# Patient Record
Sex: FEMALE | Race: ASIAN | NOT HISPANIC OR LATINO
[De-identification: names, ages, dates, MRNs, and addresses within clinical notes are randomized per-mention and may not be internally consistent; named-entity substitution may affect disease eponyms.]

---

## 2017-02-22 ENCOUNTER — RESULT REVIEW (OUTPATIENT)
Age: 67
End: 2017-02-22

## 2017-05-02 ENCOUNTER — APPOINTMENT (OUTPATIENT)
Dept: HEART AND VASCULAR | Facility: CLINIC | Age: 67
End: 2017-05-02

## 2017-05-02 VITALS
WEIGHT: 100 LBS | HEIGHT: 59 IN | BODY MASS INDEX: 20.16 KG/M2 | DIASTOLIC BLOOD PRESSURE: 62 MMHG | HEART RATE: 70 BPM | SYSTOLIC BLOOD PRESSURE: 102 MMHG

## 2017-05-03 LAB
ALBUMIN SERPL ELPH-MCNC: 4.7 G/DL
ALP BLD-CCNC: 44 U/L
ALT SERPL-CCNC: 17 U/L
ANION GAP SERPL CALC-SCNC: 15 MMOL/L
AST SERPL-CCNC: 14 U/L
BASOPHILS # BLD AUTO: 0.02 K/UL
BASOPHILS NFR BLD AUTO: 0.5 %
BILIRUB SERPL-MCNC: 0.7 MG/DL
BUN SERPL-MCNC: 14 MG/DL
CALCIUM SERPL-MCNC: 9.6 MG/DL
CHLORIDE SERPL-SCNC: 105 MMOL/L
CHOLEST SERPL-MCNC: 238 MG/DL
CHOLEST/HDLC SERPL: 2.8 RATIO
CO2 SERPL-SCNC: 23 MMOL/L
CREAT SERPL-MCNC: 0.69 MG/DL
CRP SERPL-MCNC: <0.2 MG/DL
EOSINOPHIL # BLD AUTO: 0.1 K/UL
EOSINOPHIL NFR BLD AUTO: 2.4 %
ERYTHROCYTE [SEDIMENTATION RATE] IN BLOOD BY WESTERGREN METHOD: 4 MM/HR
GLUCOSE SERPL-MCNC: 94 MG/DL
HCT VFR BLD CALC: 42.5 %
HDLC SERPL-MCNC: 86 MG/DL
HGB BLD-MCNC: 13.8 G/DL
IMM GRANULOCYTES NFR BLD AUTO: 0.2 %
LDLC SERPL CALC-MCNC: 142 MG/DL
LYMPHOCYTES # BLD AUTO: 1.34 K/UL
LYMPHOCYTES NFR BLD AUTO: 32.3 %
MAN DIFF?: NORMAL
MCHC RBC-ENTMCNC: 32.3 PG
MCHC RBC-ENTMCNC: 32.5 GM/DL
MCV RBC AUTO: 99.5 FL
MONOCYTES # BLD AUTO: 0.25 K/UL
MONOCYTES NFR BLD AUTO: 6 %
NEUTROPHILS # BLD AUTO: 2.43 K/UL
NEUTROPHILS NFR BLD AUTO: 58.6 %
PLATELET # BLD AUTO: 208 K/UL
POTASSIUM SERPL-SCNC: 4.8 MMOL/L
PROT SERPL-MCNC: 6.9 G/DL
RBC # BLD: 4.27 M/UL
RBC # FLD: 14.2 %
SODIUM SERPL-SCNC: 143 MMOL/L
TRIGL SERPL-MCNC: 52 MG/DL
WBC # FLD AUTO: 4.15 K/UL

## 2017-05-08 ENCOUNTER — TRANSCRIPTION ENCOUNTER (OUTPATIENT)
Age: 67
End: 2017-05-08

## 2017-06-13 ENCOUNTER — APPOINTMENT (OUTPATIENT)
Dept: HEART AND VASCULAR | Facility: CLINIC | Age: 67
End: 2017-06-13

## 2017-06-13 VITALS — DIASTOLIC BLOOD PRESSURE: 65 MMHG | HEART RATE: 95 BPM | SYSTOLIC BLOOD PRESSURE: 100 MMHG

## 2017-11-16 ENCOUNTER — APPOINTMENT (OUTPATIENT)
Dept: HEART AND VASCULAR | Facility: CLINIC | Age: 67
End: 2017-11-16
Payer: MEDICARE

## 2017-11-16 VITALS
DIASTOLIC BLOOD PRESSURE: 60 MMHG | WEIGHT: 100 LBS | BODY MASS INDEX: 20.16 KG/M2 | HEIGHT: 59 IN | HEART RATE: 79 BPM | SYSTOLIC BLOOD PRESSURE: 110 MMHG

## 2017-11-16 PROCEDURE — 93000 ELECTROCARDIOGRAM COMPLETE: CPT

## 2017-11-16 PROCEDURE — 99214 OFFICE O/P EST MOD 30 MIN: CPT | Mod: 25

## 2017-11-16 PROCEDURE — 93306 TTE W/DOPPLER COMPLETE: CPT

## 2017-11-16 PROCEDURE — 36415 COLL VENOUS BLD VENIPUNCTURE: CPT

## 2018-04-24 ENCOUNTER — APPOINTMENT (OUTPATIENT)
Dept: HEART AND VASCULAR | Facility: CLINIC | Age: 68
End: 2018-04-24
Payer: MEDICARE

## 2018-04-24 VITALS
BODY MASS INDEX: 19.96 KG/M2 | WEIGHT: 99 LBS | HEIGHT: 59 IN | SYSTOLIC BLOOD PRESSURE: 110 MMHG | DIASTOLIC BLOOD PRESSURE: 70 MMHG | HEART RATE: 68 BPM

## 2018-04-24 PROCEDURE — 99214 OFFICE O/P EST MOD 30 MIN: CPT | Mod: 25

## 2018-04-24 PROCEDURE — 93000 ELECTROCARDIOGRAM COMPLETE: CPT

## 2018-08-10 ENCOUNTER — APPOINTMENT (OUTPATIENT)
Dept: HEART AND VASCULAR | Facility: CLINIC | Age: 68
End: 2018-08-10
Payer: MEDICARE

## 2018-08-10 VITALS
HEART RATE: 108 BPM | TEMPERATURE: 101.1 F | WEIGHT: 98 LBS | SYSTOLIC BLOOD PRESSURE: 120 MMHG | DIASTOLIC BLOOD PRESSURE: 80 MMHG | BODY MASS INDEX: 19.79 KG/M2

## 2018-08-10 PROCEDURE — 87880 STREP A ASSAY W/OPTIC: CPT | Mod: QW

## 2018-08-10 PROCEDURE — 99213 OFFICE O/P EST LOW 20 MIN: CPT | Mod: 25

## 2018-08-13 LAB
ALBUMIN SERPL ELPH-MCNC: 4.7 G/DL
ALP BLD-CCNC: 48 U/L
ALT SERPL-CCNC: 23 U/L
ANION GAP SERPL CALC-SCNC: 14 MMOL/L
AST SERPL-CCNC: 20 U/L
BASOPHILS # BLD AUTO: 0.02 K/UL
BASOPHILS NFR BLD AUTO: 0.2 %
BILIRUB SERPL-MCNC: 0.6 MG/DL
BUN SERPL-MCNC: 9 MG/DL
CALCIUM SERPL-MCNC: 9.4 MG/DL
CHLORIDE SERPL-SCNC: 99 MMOL/L
CO2 SERPL-SCNC: 27 MMOL/L
CREAT SERPL-MCNC: 0.69 MG/DL
EOSINOPHIL # BLD AUTO: 0.06 K/UL
EOSINOPHIL NFR BLD AUTO: 0.7 %
GLUCOSE SERPL-MCNC: 95 MG/DL
HCT VFR BLD CALC: 40.2 %
HGB BLD-MCNC: 13.1 G/DL
IMM GRANULOCYTES NFR BLD AUTO: 0.2 %
LYMPHOCYTES # BLD AUTO: 1.43 K/UL
LYMPHOCYTES NFR BLD AUTO: 16.6 %
MAN DIFF?: NORMAL
MCHC RBC-ENTMCNC: 31.7 PG
MCHC RBC-ENTMCNC: 32.6 GM/DL
MCV RBC AUTO: 97.3 FL
MONOCYTES # BLD AUTO: 0.64 K/UL
MONOCYTES NFR BLD AUTO: 7.4 %
NEUTROPHILS # BLD AUTO: 6.47 K/UL
NEUTROPHILS NFR BLD AUTO: 74.9 %
PLATELET # BLD AUTO: 246 K/UL
POTASSIUM SERPL-SCNC: 4.5 MMOL/L
PROT SERPL-MCNC: 7.2 G/DL
RBC # BLD: 4.13 M/UL
RBC # FLD: 14.2 %
SODIUM SERPL-SCNC: 140 MMOL/L
WBC # FLD AUTO: 8.64 K/UL

## 2018-11-27 ENCOUNTER — APPOINTMENT (OUTPATIENT)
Dept: HEART AND VASCULAR | Facility: CLINIC | Age: 68
End: 2018-11-27
Payer: MEDICARE

## 2018-11-27 ENCOUNTER — NON-APPOINTMENT (OUTPATIENT)
Age: 68
End: 2018-11-27

## 2018-11-27 VITALS
WEIGHT: 99 LBS | HEIGHT: 59 IN | SYSTOLIC BLOOD PRESSURE: 118 MMHG | BODY MASS INDEX: 19.96 KG/M2 | HEART RATE: 107 BPM | DIASTOLIC BLOOD PRESSURE: 72 MMHG

## 2018-11-27 DIAGNOSIS — R07.89 OTHER CHEST PAIN: ICD-10-CM

## 2018-11-27 PROCEDURE — 99214 OFFICE O/P EST MOD 30 MIN: CPT | Mod: 25

## 2018-11-27 PROCEDURE — 93306 TTE W/DOPPLER COMPLETE: CPT

## 2018-11-27 PROCEDURE — 93000 ELECTROCARDIOGRAM COMPLETE: CPT

## 2018-11-27 RX ORDER — AZITHROMYCIN 500 MG/1
500 TABLET, FILM COATED ORAL DAILY
Qty: 3 | Refills: 0 | Status: DISCONTINUED | COMMUNITY
Start: 2018-08-10 | End: 2018-11-27

## 2018-11-29 PROBLEM — R07.89 CHEST TIGHTNESS: Status: RESOLVED | Noted: 2017-06-13 | Resolved: 2018-11-29

## 2018-11-29 NOTE — PHYSICAL EXAM
[General Appearance - Well Developed] : well developed [Normal Appearance] : normal appearance [Well Groomed] : well groomed [General Appearance - Well Nourished] : well nourished [No Deformities] : no deformities [General Appearance - In No Acute Distress] : no acute distress [Normal Conjunctiva] : the conjunctiva exhibited no abnormalities [Eyelids - No Xanthelasma] : the eyelids demonstrated no xanthelasmas [Normal Oral Mucosa] : normal oral mucosa [No Oral Pallor] : no oral pallor [No Oral Cyanosis] : no oral cyanosis [Normal Jugular Venous A Waves Present] : normal jugular venous A waves present [Normal Jugular Venous V Waves Present] : normal jugular venous V waves present [No Jugular Venous Mosqueda A Waves] : no jugular venous mosqueda A waves [Respiration, Rhythm And Depth] : normal respiratory rhythm and effort [Exaggerated Use Of Accessory Muscles For Inspiration] : no accessory muscle use [Auscultation Breath Sounds / Voice Sounds] : lungs were clear to auscultation bilaterally [Heart Rate And Rhythm] : heart rate and rhythm were normal [Heart Sounds] : normal S1 and S2 [Murmurs] : no murmurs present [Arterial Pulses Normal] : the arterial pulses were normal [Abdomen Soft] : soft [Abdomen Tenderness] : non-tender [Abdomen Mass (___ Cm)] : no abdominal mass palpated [Abnormal Walk] : normal gait [Gait - Sufficient For Exercise Testing] : the gait was sufficient for exercise testing [Nail Clubbing] : no clubbing of the fingernails [Cyanosis, Localized] : no localized cyanosis [Petechial Hemorrhages (___cm)] : no petechial hemorrhages [Skin Color & Pigmentation] : normal skin color and pigmentation [] : no rash [No Venous Stasis] : no venous stasis [Skin Lesions] : no skin lesions [No Skin Ulcers] : no skin ulcer [No Xanthoma] : no  xanthoma was observed [Oriented To Time, Place, And Person] : oriented to person, place, and time [Affect] : the affect was normal [Mood] : the mood was normal [No Anxiety] : not feeling anxious [FreeTextEntry1] : Reflexes intact. Motor 5/5 upper and lower extremity. Sensory intact.

## 2018-11-29 NOTE — HISTORY OF PRESENT ILLNESS
[FreeTextEntry1] : The patient has chest heaviness once a month when she is under stress. It's duration of seconds. She has also had fatigue. The patient has dyspnea on [place] subway stairs. She has an occasional cough. She is dizzy upon arising. She had an eye examination in January a Pap smear 1.5 years ago and a mammogram one year ago. She had a bone density this year which is improved. She received the flu vaccine.

## 2018-11-29 NOTE — DISCUSSION/SUMMARY
[FreeTextEntry1] : The patient has atypical/nonanginal chest discomfort. Her past stress test was normal. Her EKG is normal. Echocardiogram revealed posterior mitral valve prolapse with trace mitral regurgitation. Ejection fraction was 60%. The pain is not cardiac. The patient will see the pulmonologist. She will get a mammogram and a colonoscopy. She defers the shingles\par And we'll discuss it with Dr. Hernández.

## 2018-11-29 NOTE — REVIEW OF SYSTEMS
[Chest Pain] : chest pain [Cough] : cough [see HPI] : see HPI [Joint Pain] : joint pain [Negative] : Heme/Lymph

## 2019-03-08 ENCOUNTER — TRANSCRIPTION ENCOUNTER (OUTPATIENT)
Age: 69
End: 2019-03-08

## 2019-04-04 ENCOUNTER — APPOINTMENT (OUTPATIENT)
Dept: INTERVENTIONAL RADIOLOGY/VASCULAR | Facility: CLINIC | Age: 69
End: 2019-04-04
Payer: MEDICARE

## 2019-04-04 DIAGNOSIS — Z98.890 OTHER SPECIFIED POSTPROCEDURAL STATES: ICD-10-CM

## 2019-04-04 DIAGNOSIS — H81.49 VERTIGO OF CENTRAL ORIGIN, UNSPECIFIED EAR: ICD-10-CM

## 2019-04-04 DIAGNOSIS — Z78.9 OTHER SPECIFIED HEALTH STATUS: ICD-10-CM

## 2019-04-04 DIAGNOSIS — Z87.09 PERSONAL HISTORY OF OTHER DISEASES OF THE RESPIRATORY SYSTEM: ICD-10-CM

## 2019-04-04 DIAGNOSIS — R40.4 TRANSIENT ALTERATION OF AWARENESS: ICD-10-CM

## 2019-04-04 PROCEDURE — 99205 OFFICE O/P NEW HI 60 MIN: CPT

## 2019-04-04 NOTE — PHYSICAL EXAM
[Alert] : alert [No Acute Distress] : no acute distress [Well Nourished] : well nourished [Well Developed] : well developed [Healthy Appearance] : healthy appearance [Normal Voice/Communication] : normal voice communication [Normal Sclera/Conjunctiva] : normal sclera/conjunctiva [No Respiratory Distress] : no respiratory distress [Normal Rate and Effort] : normal respiratory rhythm and effort [No Accessory Muscle Use] : no accessory muscle use [Fully active, able to carry on all pre-disease performance without restriction] : Fully active, able to carry on all pre-disease performance without restriction [Normal Gait] : normal gait [Oriented x3] : oriented to person, place, and time [Normal Insight/Judgement] : insight and judgment were intact [Normal Affect] : the affect was normal [Normal Mood] : the mood was normal [Recent Memory Normal] : recent memory was not impaired [Remote Memory Normal] : remote memory was not impaired

## 2019-05-23 NOTE — ASSESSMENT
[Other: _____] : [unfilled] [FreeTextEntry1] : Ms. Leung is a 67 y/o female who presents today for consultation for treatment of newly diagnosed left lower PAVM.  Her CT scan from 2/25/19 demonstrates an enhancing serpiginous structure in the left upper lobe lingula compatible with pulmonary AVM.  \par \par I have reviewed her imaging and discussed the findings with  and Mrs. Leung.  I discussed the nature of pulmonary AVMs and how intervention is recommended to prevent future neurologic events from occurring. I discussed pulmonary angiogram/embolization procedure including risks, benefits, alternatives, and expected post procedure course.  Ms. Leung is planning to travel in May and would like to have the procedure deferred until she returns.  I have given her contact information for our booking office and she will be in touch with them to make arrangements.  \par \par I discussed starting prophylactic daily aspirin therapy, but pt states she was told by her rheumatologist to not take aspirin containing products.  I will be in touch with Dr. Mcfarlane to discuss this, as well as any required preprocedure changes of her medication regimen.\par \par I have provided the patient the opportunity to ask questions and have answered them to their satisfaction.  They are encouraged to contact our office with any further questions, concerns, or issues.\par

## 2019-05-23 NOTE — HISTORY OF PRESENT ILLNESS
[FreeTextEntry1] : This is a 67 y/o female with pmhx of RA, migraines, and MVP who presents today for consultation for pulmonary angiogram and embolization for newly diagnosed pulmonary AVM.  Pt reports having persistent bronchitis over the summer and so was sent for a CT scan in August by her pulmonologist. There was a questionable area of  AVM, but the study was limited due to lack of IV contrast.  \par \par A repeat CT scan with contrast was done in 2/2019 and she was found to have a left lower lobe pulmonary AVM.  She is now referred to IR by Dr. Huggins for pulmonary angiogram and embolization.  Pt reports a history of temporary peripheral vision loss a few years ago, that was attributed to a migraine and had intermittent mild epistaxis a few times, but otherwise denies neurologic deficits, hx of TIA, stroke, hypoxia, or GIB.

## 2019-05-23 NOTE — CONSULT LETTER
[Dear  ___] : Dear  [unfilled], [Courtesy Letter:] : I had the pleasure of seeing your patient, [unfilled], in my office today. [Please see my note below.] : Please see my note below. [Consult Closing:] : Thank you very much for allowing me to participate in the care of this patient.  If you have any questions, please do not hesitate to contact me. [Sincerely,] : Sincerely, [FreeTextEntry3] : Clive Ramírez MD

## 2019-05-30 ENCOUNTER — OUTPATIENT (OUTPATIENT)
Dept: OUTPATIENT SERVICES | Facility: HOSPITAL | Age: 69
LOS: 1 days | End: 2019-05-30
Payer: MEDICARE

## 2019-05-30 VITALS
DIASTOLIC BLOOD PRESSURE: 76 MMHG | HEIGHT: 59 IN | HEART RATE: 84 BPM | TEMPERATURE: 98 F | WEIGHT: 95.02 LBS | RESPIRATION RATE: 18 BRPM | SYSTOLIC BLOOD PRESSURE: 135 MMHG | OXYGEN SATURATION: 99 %

## 2019-05-30 DIAGNOSIS — Z01.818 ENCOUNTER FOR OTHER PREPROCEDURAL EXAMINATION: ICD-10-CM

## 2019-05-30 DIAGNOSIS — Z98.891 HISTORY OF UTERINE SCAR FROM PREVIOUS SURGERY: Chronic | ICD-10-CM

## 2019-05-30 DIAGNOSIS — Q25.72 CONGENITAL PULMONARY ARTERIOVENOUS MALFORMATION: ICD-10-CM

## 2019-05-30 LAB
ANION GAP SERPL CALC-SCNC: 12 MMOL/L — SIGNIFICANT CHANGE UP (ref 5–17)
BLD GP AB SCN SERPL QL: NEGATIVE — SIGNIFICANT CHANGE UP
BUN SERPL-MCNC: 14 MG/DL — SIGNIFICANT CHANGE UP (ref 7–23)
CALCIUM SERPL-MCNC: 9.8 MG/DL — SIGNIFICANT CHANGE UP (ref 8.4–10.5)
CHLORIDE SERPL-SCNC: 104 MMOL/L — SIGNIFICANT CHANGE UP (ref 96–108)
CO2 SERPL-SCNC: 26 MMOL/L — SIGNIFICANT CHANGE UP (ref 22–31)
CREAT SERPL-MCNC: 0.6 MG/DL — SIGNIFICANT CHANGE UP (ref 0.5–1.3)
GLUCOSE SERPL-MCNC: 78 MG/DL — SIGNIFICANT CHANGE UP (ref 70–99)
HCT VFR BLD CALC: 40.1 % — SIGNIFICANT CHANGE UP (ref 34.5–45)
HGB BLD-MCNC: 12.7 G/DL — SIGNIFICANT CHANGE UP (ref 11.5–15.5)
MCHC RBC-ENTMCNC: 30.8 PG — SIGNIFICANT CHANGE UP (ref 27–34)
MCHC RBC-ENTMCNC: 31.7 GM/DL — LOW (ref 32–36)
MCV RBC AUTO: 97.1 FL — SIGNIFICANT CHANGE UP (ref 80–100)
PLATELET # BLD AUTO: 254 K/UL — SIGNIFICANT CHANGE UP (ref 150–400)
POTASSIUM SERPL-MCNC: 3.5 MMOL/L — SIGNIFICANT CHANGE UP (ref 3.5–5.3)
POTASSIUM SERPL-SCNC: 3.5 MMOL/L — SIGNIFICANT CHANGE UP (ref 3.5–5.3)
RBC # BLD: 4.13 M/UL — SIGNIFICANT CHANGE UP (ref 3.8–5.2)
RBC # FLD: 13.6 % — SIGNIFICANT CHANGE UP (ref 10.3–14.5)
RH IG SCN BLD-IMP: POSITIVE — SIGNIFICANT CHANGE UP
SODIUM SERPL-SCNC: 142 MMOL/L — SIGNIFICANT CHANGE UP (ref 135–145)
WBC # BLD: 6.29 K/UL — SIGNIFICANT CHANGE UP (ref 3.8–10.5)
WBC # FLD AUTO: 6.29 K/UL — SIGNIFICANT CHANGE UP (ref 3.8–10.5)

## 2019-05-30 PROCEDURE — 86850 RBC ANTIBODY SCREEN: CPT

## 2019-05-30 PROCEDURE — 86901 BLOOD TYPING SEROLOGIC RH(D): CPT

## 2019-05-30 PROCEDURE — G0463: CPT

## 2019-05-30 PROCEDURE — 85027 COMPLETE CBC AUTOMATED: CPT

## 2019-05-30 PROCEDURE — 86900 BLOOD TYPING SEROLOGIC ABO: CPT

## 2019-05-30 PROCEDURE — 80048 BASIC METABOLIC PNL TOTAL CA: CPT

## 2019-05-30 NOTE — H&P PST ADULT - NSANTHOSAYNRD_GEN_A_CORE
No. SMITA screening performed.  STOP BANG Legend: 0-2 = LOW Risk; 3-4 = INTERMEDIATE Risk; 5-8 = HIGH Risk

## 2019-05-30 NOTE — H&P PST ADULT - HISTORY OF PRESENT ILLNESS
69 y/o F PMH asymptomatic MVP, stable bronchietasis, is being followed by Dr. Huggins from pulmonology, no recent steroids, incidentally found to have pulmonary AVM angiogram and embolization. 67 y/o F PMH asymptomatic MVP, stable bronchietasis, is being followed by Dr. Huggins from pulmonology, no recent steroids, incidentally found to have pulmonary AVM.  Presents today for pulmonary AVM angiogram and embolization.

## 2019-05-30 NOTE — H&P PST ADULT - NSICDXPASTMEDICALHX_GEN_ALL_CORE_FT
PAST MEDICAL HISTORY:  Bronchiectasis     MVP (mitral valve prolapse) PAST MEDICAL HISTORY:  Bronchiectasis     H/O migraine     MVP (mitral valve prolapse)     PFO (patent foramen ovale) as per patient is resolved    Rheumatoid arthritis

## 2019-05-30 NOTE — H&P PST ADULT - NSICDXPROBLEM_GEN_ALL_CORE_FT
PROBLEM DIAGNOSES  Problem: Congenital pulmonary arteriovenous malformation  Assessment and Plan: Pulmonary AVM angio and embolization

## 2019-05-31 ENCOUNTER — NON-APPOINTMENT (OUTPATIENT)
Age: 69
End: 2019-05-31

## 2019-05-31 ENCOUNTER — APPOINTMENT (OUTPATIENT)
Dept: HEART AND VASCULAR | Facility: CLINIC | Age: 69
End: 2019-05-31
Payer: MEDICARE

## 2019-05-31 VITALS
WEIGHT: 96 LBS | HEIGHT: 59 IN | OXYGEN SATURATION: 97 % | SYSTOLIC BLOOD PRESSURE: 118 MMHG | BODY MASS INDEX: 19.35 KG/M2 | HEART RATE: 86 BPM | DIASTOLIC BLOOD PRESSURE: 78 MMHG

## 2019-05-31 PROBLEM — Z86.69 PERSONAL HISTORY OF OTHER DISEASES OF THE NERVOUS SYSTEM AND SENSE ORGANS: Chronic | Status: ACTIVE | Noted: 2019-05-30

## 2019-05-31 PROBLEM — Q21.1 ATRIAL SEPTAL DEFECT: Chronic | Status: ACTIVE | Noted: 2019-05-30

## 2019-05-31 PROBLEM — I34.1 NONRHEUMATIC MITRAL (VALVE) PROLAPSE: Chronic | Status: ACTIVE | Noted: 2019-05-30

## 2019-05-31 PROBLEM — M06.9 RHEUMATOID ARTHRITIS, UNSPECIFIED: Chronic | Status: ACTIVE | Noted: 2019-05-30

## 2019-05-31 PROBLEM — J47.9 BRONCHIECTASIS, UNCOMPLICATED: Chronic | Status: ACTIVE | Noted: 2019-05-30

## 2019-05-31 PROCEDURE — 93000 ELECTROCARDIOGRAM COMPLETE: CPT

## 2019-05-31 PROCEDURE — 99214 OFFICE O/P EST MOD 30 MIN: CPT | Mod: 25

## 2019-06-03 ENCOUNTER — TRANSCRIPTION ENCOUNTER (OUTPATIENT)
Age: 69
End: 2019-06-03

## 2019-06-03 NOTE — DISCUSSION/SUMMARY
[FreeTextEntry1] : The patient is scheduled for coil placement and AV malformation in her lungs. She has a persistent cough. She has been walking without chest pain or dyspnea. She has no abnormal bleeding clotting or difficulty with anesthesia. Her EKG is normal.The patient's RCRI score zero.  her cardiac risk for the procedure is low. Her laboratory tests are adequate she is optiimized for the procedure.

## 2019-06-03 NOTE — HISTORY OF PRESENT ILLNESS
[FreeTextEntry1] : The patient has a pulmonary AV malformation. The plan is to place a coil. The patient was a great deal on vacation without chest pain or dyspnea. Her diet is good. Her joint pain has been under control. She has had a cough. The patient has no history of abnormal bleeding clotting or difficulty with anesthesia. Her preoperative laboratory testing was done. She was told of her recent sugar of 120

## 2019-06-03 NOTE — PHYSICAL EXAM
[General Appearance - Well Developed] : well developed [Normal Appearance] : normal appearance [Well Groomed] : well groomed [General Appearance - Well Nourished] : well nourished [No Deformities] : no deformities [General Appearance - In No Acute Distress] : no acute distress [Normal Conjunctiva] : the conjunctiva exhibited no abnormalities [Eyelids - No Xanthelasma] : the eyelids demonstrated no xanthelasmas [Normal Oral Mucosa] : normal oral mucosa [No Oral Pallor] : no oral pallor [No Oral Cyanosis] : no oral cyanosis [Normal Jugular Venous A Waves Present] : normal jugular venous A waves present [Normal Jugular Venous V Waves Present] : normal jugular venous V waves present [No Jugular Venous Mosqueda A Waves] : no jugular venous mosqueda A waves [Respiration, Rhythm And Depth] : normal respiratory rhythm and effort [Exaggerated Use Of Accessory Muscles For Inspiration] : no accessory muscle use [Auscultation Breath Sounds / Voice Sounds] : lungs were clear to auscultation bilaterally [Heart Rate And Rhythm] : heart rate and rhythm were normal [Heart Sounds] : normal S1 and S2 [Murmurs] : no murmurs present [Arterial Pulses Normal] : the arterial pulses were normal [Abdomen Soft] : soft [Abdomen Tenderness] : non-tender [Abdomen Mass (___ Cm)] : no abdominal mass palpated [Abnormal Walk] : normal gait [Gait - Sufficient For Exercise Testing] : the gait was sufficient for exercise testing [Nail Clubbing] : no clubbing of the fingernails [Cyanosis, Localized] : no localized cyanosis [Petechial Hemorrhages (___cm)] : no petechial hemorrhages [Skin Color & Pigmentation] : normal skin color and pigmentation [] : no rash [No Venous Stasis] : no venous stasis [Skin Lesions] : no skin lesions [No Skin Ulcers] : no skin ulcer [No Xanthoma] : no  xanthoma was observed [Oriented To Time, Place, And Person] : oriented to person, place, and time [Affect] : the affect was normal [Mood] : the mood was normal [No Anxiety] : not feeling anxious [FreeTextEntry1] : Tympanic membranes are normal.Throat erythema. Neck supple

## 2019-06-05 ENCOUNTER — INPATIENT (INPATIENT)
Facility: HOSPITAL | Age: 69
LOS: 0 days | Discharge: HOME | DRG: 254 | End: 2019-06-06
Attending: HOSPITALIST | Admitting: STUDENT IN AN ORGANIZED HEALTH CARE EDUCATION/TRAINING PROGRAM
Payer: COMMERCIAL

## 2019-06-05 VITALS
OXYGEN SATURATION: 100 % | DIASTOLIC BLOOD PRESSURE: 60 MMHG | TEMPERATURE: 97 F | SYSTOLIC BLOOD PRESSURE: 112 MMHG | HEART RATE: 69 BPM | RESPIRATION RATE: 14 BRPM

## 2019-06-05 DIAGNOSIS — Q25.72 CONGENITAL PULMONARY ARTERIOVENOUS MALFORMATION: ICD-10-CM

## 2019-06-05 DIAGNOSIS — Z29.9 ENCOUNTER FOR PROPHYLACTIC MEASURES, UNSPECIFIED: ICD-10-CM

## 2019-06-05 DIAGNOSIS — M06.9 RHEUMATOID ARTHRITIS, UNSPECIFIED: ICD-10-CM

## 2019-06-05 DIAGNOSIS — Z98.891 HISTORY OF UTERINE SCAR FROM PREVIOUS SURGERY: Chronic | ICD-10-CM

## 2019-06-05 PROCEDURE — 36015 PLACE CATHETER IN ARTERY: CPT | Mod: LT

## 2019-06-05 PROCEDURE — 99222 1ST HOSP IP/OBS MODERATE 55: CPT

## 2019-06-05 PROCEDURE — 37242 VASC EMBOLIZE/OCCLUDE ARTERY: CPT

## 2019-06-05 PROCEDURE — 76937 US GUIDE VASCULAR ACCESS: CPT | Mod: 26

## 2019-06-05 RX ORDER — FLUTICASONE FUROATE AND VILANTEROL TRIFENATATE 100; 25 UG/1; UG/1
1 POWDER RESPIRATORY (INHALATION)
Qty: 0 | Refills: 0 | DISCHARGE

## 2019-06-05 RX ORDER — SUMATRIPTAN SUCCINATE 4 MG/.5ML
1 INJECTION, SOLUTION SUBCUTANEOUS
Qty: 0 | Refills: 0 | DISCHARGE

## 2019-06-05 RX ORDER — CHOLECALCIFEROL (VITAMIN D3) 125 MCG
1 CAPSULE ORAL
Qty: 0 | Refills: 0 | DISCHARGE

## 2019-06-05 RX ORDER — ACETAMINOPHEN 500 MG
650 TABLET ORAL EVERY 6 HOURS
Refills: 0 | Status: DISCONTINUED | OUTPATIENT
Start: 2019-06-05 | End: 2019-06-06

## 2019-06-05 NOTE — PROGRESS NOTE ADULT - SUBJECTIVE AND OBJECTIVE BOX
67 y/o F PMH asymptomatic MVP, stable bronchietasis incidentally found to have pulmonary AVM referred to IR for pulmonary angiogram and possible AMV embolization.     NPO status: NPO past midnight .  Anticoagulation: None.      Allergies: No Known Allergies      PAST MEDICAL & SURGICAL HISTORY:  Rheumatoid arthritis  H/O migraine  PFO (patent foramen ovale): as per patient is resolved  MVP (mitral valve prolapse)  Bronchiectasis  S/P : X 2    Pertinent labs:  Complete Blood Count (19 @ 14:58)    WBC Count: 6.29 K/uL    RBC Count: 4.13 M/uL    Hemoglobin: 12.7 g/dL    Hematocrit: 40.1 %    Mean Cell Volume: 97.1 fl    Mean Cell Hemoglobin: 30.8 pg    Mean Cell Hemoglobin Conc: 31.7 gm/dL    Red Cell Distrib Width: 13.6 %    Platelet Count - Automated: 254 K/uL    Basic Metabolic Panel (19 @ 13:06)    Sodium, Serum: 142 mmol/L    Potassium, Serum: 3.5 mmol/L    Chloride, Serum: 104 mmol/L    Carbon Dioxide, Serum: 26 mmol/L    Anion Gap, Serum: 12 mmol/L    Blood Urea Nitrogen, Serum: 14 mg/dL    Creatinine, Serum: 0.60 mg/dL    Glucose, Serum: 78 mg/dL    Calcium, Total Serum: 9.8 mg/dL    eGFR if Non : 93: Interpretative comment  The units for eGFR are mL/min/1.73M2 (normalized body surface area). The  eGFR is calculated from a serum creatinine using the CKD-EPI equation.  Other variables required for calculation are race, age and sex. Among  patients with chronic kidney disease (CKD), the eGFR is useful in  determining the stage of disease according to KDOQI CKD classification.  All eGFR results are reported numerically with the following  interpretation.          GFR                    With                 Without     (ml/min/1.73 m2)    Kidney Damage       Kidney Damage        >= 90                    Stage 1                     Normal        60-89                    Stage 2                     Decreased GFR        30-59     Stage 3                     Stage 3        15-29                    Stage 4                     Stage 4        < 15                      Stage 5                     Stage 5  Each stage of CKD assumes that the associated GFR level has been in  effect for at least 3 months. Determination of stages one and two (with  eGFR > 59 ml/min/m2) requires estimation of kidney damage for at least 3  months as defined by structural or functional abnormalities.  Limitations: All estimates of GFR will be less accurate for patients at  extremes of muscle mass (including but not limited to frail elderly,  critically ill, or cancer patients), those with unusual diets, and those  with conditions associated with reduced secretion or extrarenal  elimination of creatinine. The eGFR equation is not recommended for use  in patients with unstable creatinine levels. mL/min/1.73M2    eGFR if African American: 108 mL/min/1.73M2    Consent: Procedure/risks/ Benefits explained. Informed consent obtained. Pt verbalizes understanding.

## 2019-06-05 NOTE — H&P ADULT - PROBLEM SELECTOR PLAN 2
Regular diet  Dispo: Independent Takes Embrel Wednesday and Saturday (says she does not need today) and Methotrexate on Sundays

## 2019-06-05 NOTE — H&P ADULT - NSHPPHYSICALEXAM_GEN_ALL_CORE
ICU Vital Signs Last 24 Hrs  T(C): 36 (05 Jun 2019 12:20), Max: 36 (05 Jun 2019 12:20)  T(F): 96.8 (05 Jun 2019 12:20), Max: 96.8 (05 Jun 2019 12:20)  HR: 68 (05 Jun 2019 15:15) (57 - 70)  BP: 103/56 (05 Jun 2019 15:15) (103/56 - 121/58)  BP(mean): 74 (05 Jun 2019 15:15) (74 - 84)  ABP: --  ABP(mean): --  RR: 14 (05 Jun 2019 15:15) (12 - 14)  SpO2: 100% (05 Jun 2019 15:15) (94% - 100%)

## 2019-06-05 NOTE — H&P ADULT - NSICDXPASTMEDICALHX_GEN_ALL_CORE_FT
PAST MEDICAL HISTORY:  Bronchiectasis     H/O migraine     MVP (mitral valve prolapse)     PFO (patent foramen ovale) as per patient is resolved    Rheumatoid arthritis

## 2019-06-05 NOTE — H&P ADULT - HISTORY OF PRESENT ILLNESS
68F pmhx of bronchiectesis, MVP, pulmonary AVM admitted for pulmonary embolization.  She underwent pulmonary embolization for AVM today and tolerated procedure well.  She has no recent illnesses or issues.

## 2019-06-05 NOTE — H&P ADULT - PROBLEM SELECTOR PLAN 1
S/P angio and embolization tolerated procedure well  Monitor O/N and likely DC in am. S/P angio and embolization tolerated procedure well  Monitor O/N and likely DC in am.  Tylenol PRN pain, no pain now   Right groin site w/o hematoma.

## 2019-06-06 ENCOUNTER — TRANSCRIPTION ENCOUNTER (OUTPATIENT)
Age: 69
End: 2019-06-06

## 2019-06-06 VITALS
OXYGEN SATURATION: 99 % | HEART RATE: 84 BPM | TEMPERATURE: 99 F | RESPIRATION RATE: 16 BRPM | DIASTOLIC BLOOD PRESSURE: 81 MMHG | SYSTOLIC BLOOD PRESSURE: 127 MMHG

## 2019-06-06 LAB
HCV AB S/CO SERPL IA: 0.24 S/CO — SIGNIFICANT CHANGE UP (ref 0–0.99)
HCV AB SERPL-IMP: SIGNIFICANT CHANGE UP

## 2019-06-06 PROCEDURE — 86803 HEPATITIS C AB TEST: CPT

## 2019-06-06 PROCEDURE — 71045 X-RAY EXAM CHEST 1 VIEW: CPT | Mod: 26

## 2019-06-06 PROCEDURE — 37242 VASC EMBOLIZE/OCCLUDE ARTERY: CPT

## 2019-06-06 PROCEDURE — 71045 X-RAY EXAM CHEST 1 VIEW: CPT

## 2019-06-06 PROCEDURE — 99239 HOSP IP/OBS DSCHRG MGMT >30: CPT

## 2019-06-06 PROCEDURE — 86901 BLOOD TYPING SEROLOGIC RH(D): CPT

## 2019-06-06 PROCEDURE — 76937 US GUIDE VASCULAR ACCESS: CPT

## 2019-06-06 PROCEDURE — C1889: CPT

## 2019-06-06 PROCEDURE — 86900 BLOOD TYPING SEROLOGIC ABO: CPT

## 2019-06-06 PROCEDURE — C1894: CPT

## 2019-06-06 PROCEDURE — C1887: CPT

## 2019-06-06 PROCEDURE — 36015 PLACE CATHETER IN ARTERY: CPT

## 2019-06-06 PROCEDURE — C1769: CPT

## 2019-06-06 RX ORDER — ACETAMINOPHEN 500 MG
2 TABLET ORAL
Qty: 0 | Refills: 0 | DISCHARGE
Start: 2019-06-06

## 2019-06-06 NOTE — DISCHARGE NOTE PROVIDER - NSDCFUADDAPPT_GEN_ALL_CORE_FT
Please have a CTA in 4 weeks then follow-up imaging results with Dr. Ramírez.    Please also follow-up with Dr. Huggins (pulmonology) within 2-4 weeks of discharge.

## 2019-06-06 NOTE — PROGRESS NOTE ADULT - SUBJECTIVE AND OBJECTIVE BOX
INTERVAL HPI/OVERNIGHT EVENTS: patient s/p IR embolization of pulmonary AVM     SUBJECTIVE: Patient seen and examined at bedside. Patient reports feeling well. Patient denies chest pain/shortness of breath/abdominal pain/nausea/vomiting/diarrhea/cough/fevers/chills.     ROS: otherwise negative    OBJECTIVE:    VITAL SIGNS:  ICU Vital Signs Last 24 Hrs  T(C): 36.7 (06 Jun 2019 05:07), Max: 36.9 (05 Jun 2019 16:00)  T(F): 98.1 (06 Jun 2019 05:07), Max: 98.4 (05 Jun 2019 16:00)  HR: 80 (06 Jun 2019 05:07) (57 - 87)  BP: 103/65 (06 Jun 2019 05:07) (93/51 - 127/70)  BP(mean): 81 (05 Jun 2019 20:36) (68 - 84)  RR: 18 (06 Jun 2019 05:07) (12 - 18)  SpO2: 98% (06 Jun 2019 05:07) (94% - 100%)    06-05 @ 07:01  -  06-06 @ 07:00  --------------------------------------------------------  IN: 720 mL / OUT: 875 mL / NET: -155 mL    CAPILLARY BLOOD GLUCOSE    PHYSICAL EXAM:  General: elderly female, appearing stated age. No acute distress   HEENT: normocephalic, atraumatic. Moist mucous membranes   Eyes: clear conjunctiva, PERRL  Neck: supple  Respiratory: clear to auscultation bilaterally   Cardiovascular: S1, S2. Regular rate and rhythm. No murmurs/rubs/gallops  Abdomen: soft, nondistended, nontender. +BS  Extremities: WWP, No LE edema. Right groin access nontender, no hematoma. 2+ pulses in all 4 extremities  Skin: normal color and turgor; no rash  Neurological: A&Ox3. CN2-12 intact     MEDICATIONS:  MEDICATIONS  (STANDING):    MEDICATIONS  (PRN):  acetaminophen   Tablet .. 650 milliGRAM(s) Oral every 6 hours PRN Moderate Pain (4 - 6)    ALLERGIES:  Allergies    No Known Allergies    Intolerances    LABS:    RADIOLOGY & ADDITIONAL TESTS: Reviewed.

## 2019-06-06 NOTE — PROGRESS NOTE ADULT - ATTENDING COMMENTS
-Patient seen/examined on 6/6/19. Case/plan discussed with the intern/resident as reviewed/edited by me above and in any comments below.  -Patient without any symptoms.   -IR cleared patient for DC.  -Patient stable for DC to home today with outpatient pulmonary, PMD, and IR follow up.  -31 minutes spent on the discharge process.

## 2019-06-06 NOTE — PROGRESS NOTE ADULT - SUBJECTIVE AND OBJECTIVE BOX
Interventional Radiology Follow- Up Note      68y Female s/p pulmonary angiogram and embolization of left PAVM on 6/5/19 in Interventional Radiology with Dr Ramírez.     Patient seen and examined @ bedside.   No complaints offered.    Vitals: T(F): 98.1 (06-06-19 @ 05:07), Max: 98.4 (06-05-19 @ 16:00)  HR: 80 (06-06-19 @ 05:07) (57 - 87)  BP: 103/65 (06-06-19 @ 05:07) (93/51 - 127/70)  RR: 18 (06-06-19 @ 05:07) (12 - 18)  SpO2: 98% (06-06-19 @ 05:07) (94% - 100%)  Wt(kg): --    LABS:            I&O's Detail    05 Jun 2019 07:01  -  06 Jun 2019 07:00  --------------------------------------------------------  IN:    Oral Fluid: 720 mL  Total IN: 720 mL    OUT:    Indwelling Catheter - Urethral: 775 mL    Voided: 100 mL  Total OUT: 875 mL    Total NET: -155 mL            PHYSICAL EXAM:  General: Nontoxic, in NAD  Neuro:  Alert & oriented x 3  CV: +S1+S2 regular rate and rhythm  Lung: clear to ausculation bilaterally, respirations nonlabored, good inspiratory effort  Abdomen: soft, NTND. Normactive BS  Extremities: no pedal edema or calf tenderness noted         Impression: 68y Female admitted with Congenital pulmonary arteriovenous malformation        Plan:  -continue to monitor ouput    -Flush drain per doctor orders  -trend vitals, labs  -will discuss with IR attending     Please call IR at extension [phone] with any questions, concerns, or issues regarding above. Interventional Radiology Follow- Up Note      68y Female s/p pulmonary angiogram and embolization of left PAVM on 6/5/19 in Interventional Radiology with Dr Ramírez.     Patient seen and examined @ bedside with Dr. Ramírez.   No complaints offered.    Vitals: T(F): 98.1 (06-06-19 @ 05:07), Max: 98.4 (06-05-19 @ 16:00)  HR: 80 (06-06-19 @ 05:07) (57 - 87)  BP: 103/65 (06-06-19 @ 05:07) (93/51 - 127/70)  RR: 18 (06-06-19 @ 05:07) (12 - 18)  SpO2: 98% (06-06-19 @ 05:07) (94% - 100%)  Wt(kg): --    LABS:    no new labs required      I&O's Detail    05 Jun 2019 07:01  -  06 Jun 2019 07:00  --------------------------------------------------------  IN:    Oral Fluid: 720 mL  Total IN: 720 mL    OUT:    Indwelling Catheter - Urethral: 775 mL    Voided: 100 mL  Total OUT: 875 mL    Total NET: -155 mL    PHYSICAL EXAM:  General: Nontoxic, in NAD  Neuro:  Alert & oriented x 3  Lung:  respirations nonlabored, good inspiratory effort  Abdomen: soft, NTND  Extremities: no pedal edema or calf tenderness noted, right groin puncture site c/d/i, dsg removed, no s/s of ecchymosis/hematoma, +2 femoral and DP pulse, +sensation, +movement, warm to touch    Impression: 68F pmhx of bronchiectasis MVP, pulmonary AVM admitted for pulmonary embolization.  She underwent pulmonary embolization for AVM on 6/5/19.       Plan:  -CXR (done, reviewed by Dr. Ramírez)  -OOB ambulate  -f/u CTA in 1 month-rx given  -f/u with Dr. Ramírez after imaging  -f/u with pulmonology, Dr. Huggins  -pt stable for d/c home from IR standpoint, d/c once cleared by Medicine team    Reviewed above with Medicine team.     Please call IR at extension [phone] with any questions, concerns, or issues regarding above. Interventional Radiology Follow- Up Note      68y Female s/p pulmonary angiogram and embolization of left PAVM on 6/5/19 in Interventional Radiology with Dr Ramírez.     Patient seen and examined @ bedside with Dr. Ramírez. Pt tolerating regular diet, voiding after mitchell removal.   No complaints offered.    Vitals: T(F): 98.1 (06-06-19 @ 05:07), Max: 98.4 (06-05-19 @ 16:00)  HR: 80 (06-06-19 @ 05:07) (57 - 87)  BP: 103/65 (06-06-19 @ 05:07) (93/51 - 127/70)  RR: 18 (06-06-19 @ 05:07) (12 - 18)  SpO2: 98% (06-06-19 @ 05:07) (94% - 100%)  Wt(kg): --    LABS:    no new labs required      I&O's Detail    05 Jun 2019 07:01  -  06 Jun 2019 07:00  --------------------------------------------------------  IN:    Oral Fluid: 720 mL  Total IN: 720 mL    OUT:    Indwelling Catheter - Urethral: 775 mL    Voided: 100 mL  Total OUT: 875 mL    Total NET: -155 mL    PHYSICAL EXAM:  General: Nontoxic, in NAD  Neuro:  Alert & oriented x 3  Lung:  respirations nonlabored, good inspiratory effort  Abdomen: soft, NTND  Extremities: no pedal edema or calf tenderness noted, right groin puncture site c/d/i, dsg removed, no s/s of ecchymosis/hematoma, +2 femoral and DP pulse, +sensation, +movement, warm to touch    Impression: 68F pmhx of bronchiectasis MVP, pulmonary AVM admitted for pulmonary embolization.  She underwent pulmonary embolization for AVM on 6/5/19.       Plan:  -CXR (done, reviewed by Dr. Ramírez)  -OOB ambulate  -f/u CTA in 1 month-rx given  -f/u with Dr. Ramírez after imaging  -f/u with pulmonology, Dr. Huggins  -pt stable for d/c home from IR standpoint, d/c once cleared by Medicine team    Reviewed above with Medicine team.     Please call IR at extension [phone] with any questions, concerns, or issues regarding above.

## 2019-06-06 NOTE — DISCHARGE NOTE PROVIDER - NSDCCPCAREPLAN_GEN_ALL_CORE_FT
PRINCIPAL DISCHARGE DIAGNOSIS  Diagnosis: Congenital pulmonary arteriovenous malformation  Assessment and Plan of Treatment: You had a pulmonary angiogram and embolization of left lower lobe pulmonary AVM by Dr. Ramírez in Interventional Radiology on _______.    Please have a follow up CTA in 4 weeks, then schedule follow up with Dr. Ramírez to review imaging.  Monitor right groin site for symptoms of bleeding, hard area underneath the skin, bruising, numbness, intense pain, or inability to move.  If you have any of these symptoms, contact Dr. Ramírez and seek immediate medical attention PRINCIPAL DISCHARGE DIAGNOSIS  Diagnosis: Congenital pulmonary arteriovenous malformation  Assessment and Plan of Treatment: You had a pulmonary angiogram and embolization of left lower lobe pulmonary AVM by Dr. Ramírez in Interventional Radiology on June 5th. Please have a follow up CTA in 4 weeks, then schedule follow up with Dr. Ramírez to review imaging.  Monitor right groin site for symptoms of bleeding, hardness underneath the skin, bruising, numbness, intense pain, or inability to move.  If you have any of these symptoms, contact Dr. Ramírez and seek immediate medical attention.   Please also follow up with Dr. Huggins (pulmonology) within 2-4 weeks of discharge.

## 2019-06-06 NOTE — DISCHARGE NOTE PROVIDER - CARE PROVIDER_API CALL
Clive Ramírez)  Interventional Radiology and Diagnostic Radiology  [id number] [place] [address]  Phone: [phone]  Fax: [phone]  Follow Up Time:     Tracie Huggins)  Critical Care Medicine; Internal Medicine; Pulmonary Disease  [address] [place] [address], [address] [address]  Phone: [phone]  Fax: [phone]  Follow Up Time:

## 2019-06-06 NOTE — DISCHARGE NOTE PROVIDER - NSDCACTIVITY_GEN_ALL_CORE
x 48 hours, then resume prior activity level/No heavy lifting/straining/Do not make important decisions/Do not drive or operate machinery/Showering allowed

## 2019-06-06 NOTE — DISCHARGE NOTE NURSING/CASE MANAGEMENT/SOCIAL WORK - NSDCDPATPORTLINK_GEN_ALL_CORE
You can access the [employer] Patient Portal, offered by [facility], by registering with the following website: [email or web address]

## 2019-06-06 NOTE — DISCHARGE NOTE PROVIDER - HOSPITAL COURSE
HISTORY OF PRESENT ILLNESS    68yoF of history of bronchiectasis, MVP, rheumatoid arthritis, and newly found incidental pulmonary AVM admitted for pulmonary embolization.  She underwent pulmonary embolization for AVM on June 5th and tolerated procedure well.  She has no recent illnesses or issues.         HOSPITAL COURSE    Patient was admitted to the medicine floors. She was able to tolerate food and ambulate well. The groin site was nontender, no evidence of hematoma. IR saw the patient and cleared her for discharge with outpatient follow-up.

## 2019-06-06 NOTE — PROGRESS NOTE ADULT - PROBLEM SELECTOR PLAN 3
DVT PPx: encourage ambulation   Diet: regular  Dispo: ambulating well. No home care needs. Discharge home today

## 2019-06-11 ENCOUNTER — TRANSCRIPTION ENCOUNTER (OUTPATIENT)
Age: 69
End: 2019-06-11

## 2019-07-01 ENCOUNTER — FORM ENCOUNTER (OUTPATIENT)
Age: 69
End: 2019-07-01

## 2019-07-02 ENCOUNTER — OUTPATIENT (OUTPATIENT)
Dept: OUTPATIENT SERVICES | Facility: HOSPITAL | Age: 69
LOS: 1 days | End: 2019-07-02
Payer: MEDICARE

## 2019-07-02 ENCOUNTER — APPOINTMENT (OUTPATIENT)
Dept: CT IMAGING | Facility: IMAGING CENTER | Age: 69
End: 2019-07-02
Payer: MEDICARE

## 2019-07-02 DIAGNOSIS — Q25.72 CONGENITAL PULMONARY ARTERIOVENOUS MALFORMATION: ICD-10-CM

## 2019-07-02 DIAGNOSIS — R93.89 ABNORMAL FINDINGS ON DIAGNOSTIC IMAGING OF OTHER SPECIFIED BODY STRUCTURES: ICD-10-CM

## 2019-07-02 DIAGNOSIS — Z98.891 HISTORY OF UTERINE SCAR FROM PREVIOUS SURGERY: Chronic | ICD-10-CM

## 2019-07-02 PROCEDURE — 82565 ASSAY OF CREATININE: CPT

## 2019-07-02 PROCEDURE — 71275 CT ANGIOGRAPHY CHEST: CPT

## 2019-07-02 PROCEDURE — 71275 CT ANGIOGRAPHY CHEST: CPT | Mod: 26

## 2019-07-09 ENCOUNTER — APPOINTMENT (OUTPATIENT)
Dept: INTERVENTIONAL RADIOLOGY/VASCULAR | Facility: CLINIC | Age: 69
End: 2019-07-09
Payer: MEDICARE

## 2019-07-09 VITALS
HEART RATE: 70 BPM | BODY MASS INDEX: 19.56 KG/M2 | DIASTOLIC BLOOD PRESSURE: 74 MMHG | HEIGHT: 59 IN | TEMPERATURE: 97.5 F | OXYGEN SATURATION: 99 % | SYSTOLIC BLOOD PRESSURE: 123 MMHG | WEIGHT: 97 LBS

## 2019-07-09 DIAGNOSIS — Z86.39 PERSONAL HISTORY OF OTHER ENDOCRINE, NUTRITIONAL AND METABOLIC DISEASE: ICD-10-CM

## 2019-07-09 DIAGNOSIS — H81.02 MENIERE'S DISEASE, LEFT EAR: ICD-10-CM

## 2019-07-09 PROCEDURE — 99214 OFFICE O/P EST MOD 30 MIN: CPT

## 2019-07-19 ENCOUNTER — APPOINTMENT (OUTPATIENT)
Dept: HEART AND VASCULAR | Facility: CLINIC | Age: 69
End: 2019-07-19
Payer: MEDICARE

## 2019-07-19 VITALS
DIASTOLIC BLOOD PRESSURE: 64 MMHG | WEIGHT: 97 LBS | OXYGEN SATURATION: 98 % | HEIGHT: 59 IN | BODY MASS INDEX: 19.56 KG/M2 | SYSTOLIC BLOOD PRESSURE: 102 MMHG | HEART RATE: 81 BPM

## 2019-07-19 PROCEDURE — 36415 COLL VENOUS BLD VENIPUNCTURE: CPT

## 2019-07-19 PROCEDURE — 93000 ELECTROCARDIOGRAM COMPLETE: CPT

## 2019-07-19 PROCEDURE — 99214 OFFICE O/P EST MOD 30 MIN: CPT | Mod: 25

## 2019-07-21 LAB
ALBUMIN SERPL ELPH-MCNC: 4.7 G/DL
ALP BLD-CCNC: 40 U/L
ALT SERPL-CCNC: 15 U/L
ANION GAP SERPL CALC-SCNC: 9 MMOL/L
APPEARANCE: CLEAR
APPEARANCE: CLEAR
AST SERPL-CCNC: 18 U/L
BACTERIA: ABNORMAL
BASOPHILS # BLD AUTO: 0.03 K/UL
BASOPHILS NFR BLD AUTO: 0.6 %
BILIRUB SERPL-MCNC: 0.4 MG/DL
BILIRUBIN URINE: NEGATIVE
BILIRUBIN URINE: NEGATIVE
BLOOD URINE: NEGATIVE
BLOOD URINE: NEGATIVE
BUN SERPL-MCNC: 15 MG/DL
CALCIUM SERPL-MCNC: 9.7 MG/DL
CHLORIDE SERPL-SCNC: 106 MMOL/L
CO2 SERPL-SCNC: 27 MMOL/L
COLOR: NORMAL
COLOR: NORMAL
CREAT SERPL-MCNC: 0.61 MG/DL
EOSINOPHIL # BLD AUTO: 0.08 K/UL
EOSINOPHIL NFR BLD AUTO: 1.5 %
GLUCOSE QUALITATIVE U: NEGATIVE
GLUCOSE QUALITATIVE U: NEGATIVE
GLUCOSE SERPL-MCNC: 90 MG/DL
HCT VFR BLD CALC: 40.9 %
HGB BLD-MCNC: 12.9 G/DL
HYALINE CASTS: 0 /LPF
IMM GRANULOCYTES NFR BLD AUTO: 0.2 %
KETONES URINE: NEGATIVE
KETONES URINE: NEGATIVE
LEUKOCYTE ESTERASE URINE: NEGATIVE
LEUKOCYTE ESTERASE URINE: NEGATIVE
LPL SERPL-CCNC: 37 U/L
LYMPHOCYTES # BLD AUTO: 1.29 K/UL
LYMPHOCYTES NFR BLD AUTO: 24.1 %
MAN DIFF?: NORMAL
MCHC RBC-ENTMCNC: 31 PG
MCHC RBC-ENTMCNC: 31.5 GM/DL
MCV RBC AUTO: 98.3 FL
MICROSCOPIC-UA: NORMAL
MONOCYTES # BLD AUTO: 0.33 K/UL
MONOCYTES NFR BLD AUTO: 6.2 %
NEUTROPHILS # BLD AUTO: 3.62 K/UL
NEUTROPHILS NFR BLD AUTO: 67.4 %
NITRITE URINE: NEGATIVE
NITRITE URINE: NEGATIVE
PH URINE: 6.5
PH URINE: 6.5
PLATELET # BLD AUTO: 230 K/UL
POTASSIUM SERPL-SCNC: 4.2 MMOL/L
PROT SERPL-MCNC: 6.8 G/DL
PROTEIN URINE: NEGATIVE
PROTEIN URINE: NEGATIVE
RBC # BLD: 4.16 M/UL
RBC # FLD: 13.9 %
RED BLOOD CELLS URINE: 4 /HPF
SODIUM SERPL-SCNC: 142 MMOL/L
SPECIFIC GRAVITY URINE: 1.02
SPECIFIC GRAVITY URINE: 1.02
SQUAMOUS EPITHELIAL CELLS: 0 /HPF
UROBILINOGEN URINE: NORMAL
UROBILINOGEN URINE: NORMAL
WBC # FLD AUTO: 5.36 K/UL
WHITE BLOOD CELLS URINE: 0 /HPF

## 2019-07-21 NOTE — HISTORY OF PRESENT ILLNESS
[FreeTextEntry1] : The patient had closure of her pulmonary AV fistula on June 5. Following that she developed pressure in her right lower quadrant which lasted from 30-60 seconds. It occurred at any time and was described as uncomfortable and tender. She followed up with Dr. Ramírez and no issue was found. Her abdomen was distended but this resolved. She had colonoscopy last December.

## 2019-07-21 NOTE — PHYSICAL EXAM
[General Appearance - Well Developed] : well developed [Normal Appearance] : normal appearance [Well Groomed] : well groomed [No Deformities] : no deformities [General Appearance - Well Nourished] : well nourished [General Appearance - In No Acute Distress] : no acute distress [Normal Conjunctiva] : the conjunctiva exhibited no abnormalities [Eyelids - No Xanthelasma] : the eyelids demonstrated no xanthelasmas [Normal Oral Mucosa] : normal oral mucosa [No Oral Pallor] : no oral pallor [No Oral Cyanosis] : no oral cyanosis [Normal Jugular Venous A Waves Present] : normal jugular venous A waves present [FreeTextEntry1] : Tympanic membranes are normal.Throat erythema. Neck supple [Normal Jugular Venous V Waves Present] : normal jugular venous V waves present [No Jugular Venous Mosqueda A Waves] : no jugular venous mosqueda A waves [Respiration, Rhythm And Depth] : normal respiratory rhythm and effort [Exaggerated Use Of Accessory Muscles For Inspiration] : no accessory muscle use [Auscultation Breath Sounds / Voice Sounds] : lungs were clear to auscultation bilaterally [Heart Rate And Rhythm] : heart rate and rhythm were normal [Heart Sounds] : normal S1 and S2 [Murmurs] : no murmurs present [Abdomen Soft] : soft [Arterial Pulses Normal] : the arterial pulses were normal [Abdomen Tenderness] : non-tender [Abdomen Mass (___ Cm)] : no abdominal mass palpated [Abnormal Walk] : normal gait [Gait - Sufficient For Exercise Testing] : the gait was sufficient for exercise testing [Cyanosis, Localized] : no localized cyanosis [Nail Clubbing] : no clubbing of the fingernails [Petechial Hemorrhages (___cm)] : no petechial hemorrhages [Skin Color & Pigmentation] : normal skin color and pigmentation [No Venous Stasis] : no venous stasis [] : no rash [Skin Lesions] : no skin lesions [No Skin Ulcers] : no skin ulcer [No Xanthoma] : no  xanthoma was observed [Affect] : the affect was normal [Oriented To Time, Place, And Person] : oriented to person, place, and time [Mood] : the mood was normal [No Anxiety] : not feeling anxious

## 2019-07-21 NOTE — DISCUSSION/SUMMARY
[FreeTextEntry1] : The patient is having brief episodes of abdominal pain. This is likely not directly related to her AV fistula closure. She is eating well and her weight is stable. There are no other gastrointestinal signs. She had a recent colonoscopy. Her laboratory testing is unremarkable. She was referred for an abdominal sonogram.

## 2019-07-21 NOTE — REVIEW OF SYSTEMS
[Abdominal Pain] : abdominal pain [see HPI] : see HPI [Joint Pain] : joint pain [Negative] : Heme/Lymph

## 2019-07-24 ENCOUNTER — FORM ENCOUNTER (OUTPATIENT)
Age: 69
End: 2019-07-24

## 2019-07-25 ENCOUNTER — APPOINTMENT (OUTPATIENT)
Dept: ULTRASOUND IMAGING | Facility: IMAGING CENTER | Age: 69
End: 2019-07-25
Payer: MEDICARE

## 2019-07-25 ENCOUNTER — OUTPATIENT (OUTPATIENT)
Dept: OUTPATIENT SERVICES | Facility: HOSPITAL | Age: 69
LOS: 1 days | End: 2019-07-25
Payer: MEDICARE

## 2019-07-25 DIAGNOSIS — Z98.891 HISTORY OF UTERINE SCAR FROM PREVIOUS SURGERY: Chronic | ICD-10-CM

## 2019-07-25 DIAGNOSIS — R10.9 UNSPECIFIED ABDOMINAL PAIN: ICD-10-CM

## 2019-07-25 PROCEDURE — 76700 US EXAM ABDOM COMPLETE: CPT | Mod: 26

## 2019-07-25 PROCEDURE — 76700 US EXAM ABDOM COMPLETE: CPT

## 2019-09-20 NOTE — PATIENT PROFILE ADULT - NSASFALLATTEMPTOOB_GEN_A_NUR
Refill request received for Adderall XR 15mg daily  Last office visit: 08/23/2019  Next office visit due: 02/2019 for med check  Last refill: 08/23/2019  PDMP reviewed - last dispensed: 08/27/2019 #30    Spoke with mother and advised it is too soon for refill.  Advised request will be postponed until 09/23/19 and then refill will be sent by covering provider. Patient's mother verbalized understanding and has no other questions or concerns at this time.   no

## 2020-01-13 ENCOUNTER — APPOINTMENT (OUTPATIENT)
Dept: PHYSICAL MEDICINE AND REHAB | Facility: CLINIC | Age: 70
End: 2020-01-13
Payer: MEDICARE

## 2020-01-13 VITALS
SYSTOLIC BLOOD PRESSURE: 105 MMHG | HEIGHT: 59 IN | OXYGEN SATURATION: 100 % | BODY MASS INDEX: 20.16 KG/M2 | DIASTOLIC BLOOD PRESSURE: 69 MMHG | WEIGHT: 100 LBS | HEART RATE: 80 BPM

## 2020-01-13 PROCEDURE — 99204 OFFICE O/P NEW MOD 45 MIN: CPT | Mod: GC

## 2020-01-13 NOTE — REVIEW OF SYSTEMS
[Patient Intake Form Reviewed] : Patient intake form was reviewed [Negative] : Gastrointestinal [Fever] : no fever [Incontinence] : no incontinence [Muscle Weakness] : no muscle weakness [Difficulty Walking] : no difficulty walking

## 2020-01-13 NOTE — PHYSICAL EXAM
[FreeTextEntry1] : General: Well developed female in no apparent distress. Patient is awake, alert, and oriented x3. Cooperative.\par Lungs: Clear to auscultation.\par Cardiac: Regular rate and rhythm.\par Abdomen: Bowel sounds present, nondistended.\par Extremities: No cyanosis, clubbing, edema noted.  \par \par Right hip examination: Some tenderness noted on palpation at the right ASIS. No ecchymosis, edema or erythema noted. Right hip with 40° internal rotation, 45° external rotation passively. Significant pain noted on passive external rotation in the right groin.\par Left hip with 40° internal rotation and 80° external rotation passively without pain.\par \par Motor:\par Both upper extremities: Tone normal, active range of motion within functional limits with 5/5 motor power throughout.\par Both lower extremities: Tone normal, active range of motion within functional limits with 5/5 motor power throughout.\par \par Sensory: Intact to light touch and pinprick and both lower extremities.\par Muscle stretch reflexes: +2/3 KJ, +2 AJ and symmetric.\par \par Functional status: Independent ambulation without assistive device. Good heel strike noted, no antalgia.

## 2020-01-13 NOTE — HISTORY OF PRESENT ILLNESS
[FreeTextEntry1] : Patient is a 69-year-old female history RA, osteoporosis who underwent a coiling procedure for a pulmonary AVM (June 2019) and has been complaining of right groin pain which started a few weeks after the procedure. Patient has been evaluated with CT pelvis and ultrasound which were negative. MRI right hip pending. Pain is in the right groin, to inside thigh exacerbated with crossing her legs but can occur at any time whether sitting, standing or walking. Symptoms today we will last for a few hours. Patient describes the pain as a dull ache with episodes of sharpness. Pain score today 4/10. Patient denies focal motor weakness, numbness,  or bowel bladder incontinence. No back pain. Patient denies any swelling, ecchymosis after her procedure or redness. Rheumatologist allowed her to take 5 days of Motrin which did provide some relief. No physical therapy, oral steroids

## 2020-01-14 ENCOUNTER — TRANSCRIPTION ENCOUNTER (OUTPATIENT)
Age: 70
End: 2020-01-14

## 2020-01-16 NOTE — PROGRESS NOTE ADULT - PROBLEM SELECTOR PLAN 1
Patient's parent updated   - S/P angio and embolization tolerated procedure well  - Tylenol PRN pain, no pain now   - Right groin site w/o hematoma. 2+ peripheral pulses  - will await IR clearance and outpatient follow-up before discharge - S/P angio and embolization tolerated procedure well  - Tylenol PRN pain, no pain now   - Right groin site w/o hematoma. 2+ peripheral pulses  - will await IR clearance and outpatient follow-up before discharge - cleared.

## 2020-01-17 ENCOUNTER — APPOINTMENT (OUTPATIENT)
Dept: HEART AND VASCULAR | Facility: CLINIC | Age: 70
End: 2020-01-17
Payer: MEDICARE

## 2020-01-17 ENCOUNTER — NON-APPOINTMENT (OUTPATIENT)
Age: 70
End: 2020-01-17

## 2020-01-17 VITALS
WEIGHT: 100 LBS | HEIGHT: 59 IN | HEART RATE: 73 BPM | SYSTOLIC BLOOD PRESSURE: 108 MMHG | BODY MASS INDEX: 20.16 KG/M2 | DIASTOLIC BLOOD PRESSURE: 66 MMHG

## 2020-01-17 PROCEDURE — 36415 COLL VENOUS BLD VENIPUNCTURE: CPT

## 2020-01-17 PROCEDURE — G0439: CPT

## 2020-01-17 PROCEDURE — 93306 TTE W/DOPPLER COMPLETE: CPT

## 2020-01-17 PROCEDURE — 93000 ELECTROCARDIOGRAM COMPLETE: CPT

## 2020-01-17 RX ORDER — MONTELUKAST SODIUM 10 MG/1
TABLET, FILM COATED ORAL
Refills: 0 | Status: ACTIVE | COMMUNITY

## 2020-01-19 LAB
ALBUMIN SERPL ELPH-MCNC: 4.6 G/DL
ALP BLD-CCNC: 42 U/L
ALT SERPL-CCNC: 19 U/L
ANION GAP SERPL CALC-SCNC: 11 MMOL/L
AST SERPL-CCNC: 18 U/L
BASOPHILS # BLD AUTO: 0.03 K/UL
BASOPHILS NFR BLD AUTO: 0.5 %
BILIRUB SERPL-MCNC: 0.4 MG/DL
BUN SERPL-MCNC: 14 MG/DL
CALCIUM SERPL-MCNC: 9.1 MG/DL
CHLORIDE SERPL-SCNC: 104 MMOL/L
CHOLEST SERPL-MCNC: 209 MG/DL
CHOLEST/HDLC SERPL: 2.8 RATIO
CO2 SERPL-SCNC: 28 MMOL/L
CREAT SERPL-MCNC: 0.53 MG/DL
EOSINOPHIL # BLD AUTO: 0.07 K/UL
EOSINOPHIL NFR BLD AUTO: 1.2 %
ESTIMATED AVERAGE GLUCOSE: 111 MG/DL
GLUCOSE SERPL-MCNC: 84 MG/DL
HBA1C MFR BLD HPLC: 5.5 %
HCT VFR BLD CALC: 43.1 %
HDLC SERPL-MCNC: 75 MG/DL
HGB BLD-MCNC: 13.6 G/DL
IMM GRANULOCYTES NFR BLD AUTO: 0 %
LDLC SERPL CALC-MCNC: 119 MG/DL
LYMPHOCYTES # BLD AUTO: 1.52 K/UL
LYMPHOCYTES NFR BLD AUTO: 26.3 %
MAN DIFF?: NORMAL
MCHC RBC-ENTMCNC: 31.6 GM/DL
MCHC RBC-ENTMCNC: 31.6 PG
MCV RBC AUTO: 100 FL
MONOCYTES # BLD AUTO: 0.35 K/UL
MONOCYTES NFR BLD AUTO: 6.1 %
NEUTROPHILS # BLD AUTO: 3.81 K/UL
NEUTROPHILS NFR BLD AUTO: 65.9 %
PLATELET # BLD AUTO: 246 K/UL
POTASSIUM SERPL-SCNC: 4.2 MMOL/L
PROT SERPL-MCNC: 6.7 G/DL
RBC # BLD: 4.31 M/UL
RBC # FLD: 13.8 %
SODIUM SERPL-SCNC: 142 MMOL/L
TRIGL SERPL-MCNC: 80 MG/DL
TSH SERPL-ACNC: 0.27 UIU/ML
WBC # FLD AUTO: 5.78 K/UL

## 2020-01-22 NOTE — REVIEW OF SYSTEMS
[Cough] : cough [Abdominal Pain] : abdominal pain [Joint Pain] : joint pain [Fever] : no fever [Chills] : no chills [Blurry Vision] : no blurred vision [Seeing Double (Diplopia)] : no diplopia [Eye Pain] : no eye pain [Earache] : no earache [Sore Throat] : no sore throat [Shortness Of Breath] : no shortness of breath [Discharge From The Ears] : no discharge from the ears [Dyspnea on exertion] : not dyspnea during exertion [Chest  Pressure] : no chest pressure [Lower Ext Edema] : no extremity edema [Leg Claudication] : no intermittent leg claudication [Chest Pain] : no chest pain [Palpitations] : no palpitations [Wheezing] : no wheezing [Nausea] : no nausea [Vomiting] : no vomiting [Heartburn] : no heartburn [Dysphagia] : no dysphagia [Dysuria] : no dysuria [Pelvic Pain] : no pelvic pain [Vaginal Discharge] : no vaginal discharge [Muscle Cramps] : no muscle cramps [Limb Weakness (Paresis)] : no limb weakness [Dizziness] : no dizziness [Tingling (Paresthesia)] : no tingling [Easy Bleeding] : no tendency for easy bleeding [Easy Bruising] : no tendency for easy bruising

## 2020-01-22 NOTE — DISCUSSION/SUMMARY
[FreeTextEntry1] : 69 year old female with past medical history of HLD, Osteoporosis, Migraine and Pulmonary Arteriovenous Malformation here for follow up.\par \par Vital signs and physical exam is unremarkable. Fasting lipids sent today. Blood pressure is controlled. Her EKG is NSR at 73 bpm. She can get her stress test at next visit. She will continue with follow up for MRI of the right hip, could be the source of thought RLQ abdominal/hip  pain. \par \par  Zetia  most likely secondary to bronchiectasis. Will give trial of antihistamines.\par Maintenance-the patient is up-to-date.\par \par I was physically present for the key portions of the evaluation and management service provided. I agree with the above history physical and plan which I reviewed and  edited where appropriate. Case discussed with JALEEL Lara.

## 2020-01-22 NOTE — HISTORY OF PRESENT ILLNESS
[FreeTextEntry1] : 69 year old female with past medical history of HLD, Osteoporosis, Migraine and Pulmonary Arteriovenous Malformation here for follow up.\par \par Sine last follow up, has visit Dr Taylor for possible osteoarthritis of right hip and has started physical therapy with plans to go for MRI of the right hip. She states the pain of the right lower quadrant of the abdominal area remains with no changes for the last 6 months. She continues to have regular bowel movements. She denies any nausea, vomiting, diarrhea, constipation, fever or changes in urine. \par \par Patient trys to active by walking regularly. States she can walk 1 mile straight without complications. Shes also able to walk up 2 flights of stairs at home without problem. She does admit to having no restriction on her diet. \par \par Her chronic cough is still present. She denies any production, fever, chest pain, difficulty breathing. She only notices any reflux if she eats green peppers so she trys to avoid them. She has plans to see a pulmonologist end of March. Her lower back pain improved with no changes in urine, lower extremity weakness or numbness. She has reported 2 episodes of insomnia in the past but is trying breathing exercises to control them. Her migraines are controlled, no changes.\par \par Gynecologist evaluation 09/2019 - normal pap smear and breast exam\par Mammogram done 12/2019\par Colonoscopy done 12/2018\par Ophthalmologist due end of this month\par Bone Density done 2018\par Immunization - influenza done this season \par \par Fasting Today\par \par

## 2020-02-25 ENCOUNTER — APPOINTMENT (OUTPATIENT)
Dept: PHYSICAL MEDICINE AND REHAB | Facility: CLINIC | Age: 70
End: 2020-02-25
Payer: MEDICARE

## 2020-02-25 VITALS — HEART RATE: 71 BPM | DIASTOLIC BLOOD PRESSURE: 70 MMHG | OXYGEN SATURATION: 98 % | SYSTOLIC BLOOD PRESSURE: 126 MMHG

## 2020-02-25 DIAGNOSIS — M25.551 PAIN IN RIGHT HIP: ICD-10-CM

## 2020-02-25 DIAGNOSIS — M16.11 UNILATERAL PRIMARY OSTEOARTHRITIS, RIGHT HIP: ICD-10-CM

## 2020-02-25 PROCEDURE — 99213 OFFICE O/P EST LOW 20 MIN: CPT

## 2020-02-25 NOTE — ASSESSMENT
[FreeTextEntry1] : Patient is a 69-year-old female history of rheumatoid arthritis, osteoporosis with complaints of right groin pain after coiling procedure for a pulmonary AVM (June 2019). MRI right hip negative, no radicular dysfunction on exam. Symptoms have improve. Will continue outpatient PT.

## 2020-02-25 NOTE — PHYSICAL EXAM
[FreeTextEntry1] : General: Well developed female in no apparent distress. Patient is awake, alert, and oriented x3. Cooperative.\par Extremities: No cyanosis, clubbing, edema noted.  \par \par Right hip examination:  No ecchymosis, edema or erythema noted. Right hip with 50° internal rotation, 50° external rotation passively. Mild pain noted on passive external rotation in the right groin.\par Left hip with 40° internal rotation and 80° external rotation passively without pain.\par \par Motor:\par Both upper extremities: Tone normal, active range of motion within functional limits with 5/5 motor power throughout.\par Both lower extremities: Tone normal, active range of motion within functional limits with 5/5 motor power throughout.\par \par Sensory: Intact to light touch and pinprick and both lower extremities, including L1/L2/L3 dermatomes.\par Muscle stretch reflexes: +2/3 KJ, +2 AJ and symmetric. Negative Babinski (B)\par \par Functional status: Independent ambulation without assistive device. Good heel strike noted, no antalgia.

## 2020-02-25 NOTE — REVIEW OF SYSTEMS
[Joint Pain] : joint pain [Negative] : Gastrointestinal [Fever] : no fever [Incontinence] : no incontinence [Difficulty Walking] : no difficulty walking [Muscle Weakness] : no muscle weakness

## 2020-02-25 NOTE — HISTORY OF PRESENT ILLNESS
[FreeTextEntry1] : Patient is a 69-year-old female history RA, osteoporosis who underwent a coiling procedure for a pulmonary AVM (June 2019) who was last seen Jan. 13, 2020 with complaints of right groin pain which started a few weeks after the procedure. MRI right hip negative for DJD or labrum injury. Pain is significantly better since last visit. Pain is present ~5x/wk lasting a few hours. Pain mostly a dull pain with episodes of sharpness. No specific movement exacerbates the pain. Patient reports improved ability to cross legs, but still dreased ROM. Pain score today 1.5/10. Patient denies focal motor weakness, numbness,  or bowel bladder incontinence. No change of pain with valsalva man. No back pain.

## 2020-04-23 ENCOUNTER — APPOINTMENT (OUTPATIENT)
Dept: PHYSICAL MEDICINE AND REHAB | Facility: CLINIC | Age: 70
End: 2020-04-23

## 2020-04-23 PROBLEM — Q25.72 PULMONARY ARTERIOVENOUS MALFORMATION: Status: ACTIVE | Noted: 2019-04-04

## 2020-04-24 ENCOUNTER — APPOINTMENT (OUTPATIENT)
Dept: INTERVENTIONAL RADIOLOGY/VASCULAR | Facility: CLINIC | Age: 70
End: 2020-04-24
Payer: MEDICARE

## 2020-04-24 DIAGNOSIS — Q25.72 CONGENITAL PULMONARY ARTERIOVENOUS MALFORMATION: ICD-10-CM

## 2020-04-24 PROCEDURE — 99213 OFFICE O/P EST LOW 20 MIN: CPT | Mod: 95

## 2020-04-24 PROCEDURE — 99202 OFFICE O/P NEW SF 15 MIN: CPT | Mod: 95

## 2020-05-17 NOTE — PHYSICAL EXAM
[General Appearance - Well Developed] : well developed [Normal Appearance] : normal appearance [General Appearance - Well Nourished] : well nourished [Normal Conjunctiva] : the conjunctiva exhibited no abnormalities [Eyelids - No Xanthelasma] : the eyelids demonstrated no xanthelasmas [Normal Oral Mucosa] : normal oral mucosa [No Oral Pallor] : no oral pallor [Normal Oropharynx] : normal oropharynx [Respiration, Rhythm And Depth] : normal respiratory rhythm and effort [Exaggerated Use Of Accessory Muscles For Inspiration] : no accessory muscle use [Auscultation Breath Sounds / Voice Sounds] : lungs were clear to auscultation bilaterally [Heart Rate And Rhythm] : heart rate and rhythm were normal [Heart Sounds] : normal S1 and S2 [Murmurs] : no murmurs present [Arterial Pulses Normal] : the arterial pulses were normal [Edema] : no peripheral edema present [Bowel Sounds] : normal bowel sounds [Veins - Varicosity Changes] : no varicosital changes were noted in the lower extremities [Abdomen Soft] : soft [Abdomen Tenderness] : non-tender [Abdomen Mass (___ Cm)] : no abdominal mass palpated [Abdomen Hernia] : no hernia was discovered [Abnormal Walk] : normal gait [Gait - Sufficient For Exercise Testing] : the gait was sufficient for exercise testing [Skin Color & Pigmentation] : normal skin color and pigmentation [Skin Turgor] : normal skin turgor [No Venous Stasis] : no venous stasis [] : no rash [Skin Lesions] : no skin lesions [Oriented To Time, Place, And Person] : oriented to person, place, and time [Impaired Insight] : insight and judgment were intact [Affect] : the affect was normal [Mood] : the mood was normal [FreeTextEntry1] : Sensory, Motor grossly intact no

## 2020-07-20 ENCOUNTER — TRANSCRIPTION ENCOUNTER (OUTPATIENT)
Age: 70
End: 2020-07-20

## 2020-08-21 ENCOUNTER — APPOINTMENT (OUTPATIENT)
Dept: CARDIOLOGY | Facility: CLINIC | Age: 70
End: 2020-08-21
Payer: MEDICARE

## 2020-08-21 ENCOUNTER — NON-APPOINTMENT (OUTPATIENT)
Age: 70
End: 2020-08-21

## 2020-08-21 VITALS
SYSTOLIC BLOOD PRESSURE: 121 MMHG | TEMPERATURE: 97.6 F | HEART RATE: 74 BPM | WEIGHT: 100 LBS | HEIGHT: 59 IN | OXYGEN SATURATION: 97 % | BODY MASS INDEX: 20.16 KG/M2 | DIASTOLIC BLOOD PRESSURE: 76 MMHG

## 2020-08-21 DIAGNOSIS — Z87.898 PERSONAL HISTORY OF OTHER SPECIFIED CONDITIONS: ICD-10-CM

## 2020-08-21 PROCEDURE — 99205 OFFICE O/P NEW HI 60 MIN: CPT

## 2020-08-21 PROCEDURE — 93000 ELECTROCARDIOGRAM COMPLETE: CPT

## 2020-08-24 NOTE — PHYSICAL EXAM
[General Appearance - Well Developed] : well developed [Normal Appearance] : normal appearance [Well Groomed] : well groomed [General Appearance - Well Nourished] : well nourished [No Deformities] : no deformities [General Appearance - In No Acute Distress] : no acute distress [Normal Conjunctiva] : the conjunctiva exhibited no abnormalities [Eyelids - No Xanthelasma] : the eyelids demonstrated no xanthelasmas [Normal Oral Mucosa] : normal oral mucosa [No Oral Pallor] : no oral pallor [No Oral Cyanosis] : no oral cyanosis [Normal Jugular Venous A Waves Present] : normal jugular venous A waves present [Normal Jugular Venous V Waves Present] : normal jugular venous V waves present [No Jugular Venous Mosqueda A Waves] : no jugular venous mosqueda A waves [Heart Rate And Rhythm] : heart rate and rhythm were normal [Heart Sounds] : normal S1 and S2 [Murmurs] : no murmurs present [Respiration, Rhythm And Depth] : normal respiratory rhythm and effort [Exaggerated Use Of Accessory Muscles For Inspiration] : no accessory muscle use [Auscultation Breath Sounds / Voice Sounds] : lungs were clear to auscultation bilaterally [Abdomen Soft] : soft [Abdomen Tenderness] : non-tender [Abdomen Mass (___ Cm)] : no abdominal mass palpated [Abnormal Walk] : normal gait [Gait - Sufficient For Exercise Testing] : the gait was sufficient for exercise testing [Nail Clubbing] : no clubbing of the fingernails [Cyanosis, Localized] : no localized cyanosis [Petechial Hemorrhages (___cm)] : no petechial hemorrhages [Skin Color & Pigmentation] : normal skin color and pigmentation [] : no rash [No Venous Stasis] : no venous stasis [Skin Lesions] : no skin lesions [No Skin Ulcers] : no skin ulcer [No Xanthoma] : no  xanthoma was observed [Oriented To Time, Place, And Person] : oriented to person, place, and time [Affect] : the affect was normal [Mood] : the mood was normal [No Anxiety] : not feeling anxious

## 2020-08-24 NOTE — REASON FOR VISIT
[FreeTextEntry1] : I had the pleasure of evaluating Ms. Anjana Leung who presents for second opinion cardiovascular evaluation.  She is also considering establishing care with our practice.  She is a 69 year old woman with hyperlipidemia, osteoporosis/OA right hip/chronic back pain, migraine headaches, bronchiectasis, and incidental finding of left lower lobe pulmonary arteriovenous malformation which underwent coil embolization in June 2019 (chest CTA performed 7/2/19 which revealed coils are within the lingular AVM).  She reports that she has had RLQ discomfort that was initially painful following the procedure, but while symptoms have improved, symptoms still remain. \par \par From the cardiac perspective, she reports a history of previously diagnosed mitral valve prolapse.  She has had intermittent and recurrent bouts of substernal chest pain, not necessarily related with exertion, and had prompted several prior visits to the ED.  Last echocardiographic assessment was performed in Jan 2020 which demonstrated LVEF 70%, and MVP involving the posterior mitral leaflet, mild AI.  Last stress test was performed in June 2017, which was unremarkable.\par \par She states that she has had recurrent bouts of chest pain over the past several months.  While she admits to having increased stress and anxiety, she also has risk factors that include hyperlipidemia.  \par \par Lipid panel 1/19/20: Chol 209, LDL-C 119\par \par 12-lead ECG for chest pain notes SR, normal axis, normal intervals, with low voltage.  BP normal.  Physical examination was unremarkable.\par \par At this time, recommend the following:\par 1. carotid artery ultrasound -- for symptoms of dizziness and to assess for atherosclerotic plaque burden\par 2. CTA coronary artery for her recurrent symptoms of chest pain (will recommend different modality than NST which she has had in the past).\par \par Thank you for entrusting her care with me.\par \par Warmest regards,\par Sixto Lowry\par \par Gynecologist evaluation 09/2019 - normal pap smear and breast exam\par Mammogram done 12/2019\par Colonoscopy done 12/2018\par Ophthalmologist due end of this month\par Bone Density done 2018\par Immunization - influenza done this season \par \par Fasting Today\par

## 2020-08-24 NOTE — REVIEW OF SYSTEMS
[Headache] : headache [Chest  Pressure] : chest pressure [Chest Pain] : chest pain [Dizziness] : dizziness [Anxiety] : anxiety [Negative] : Heme/Lymph

## 2020-09-10 ENCOUNTER — OUTPATIENT (OUTPATIENT)
Dept: OUTPATIENT SERVICES | Facility: HOSPITAL | Age: 70
LOS: 1 days | End: 2020-09-10
Payer: MEDICARE

## 2020-09-10 ENCOUNTER — LABORATORY RESULT (OUTPATIENT)
Age: 70
End: 2020-09-10

## 2020-09-10 ENCOUNTER — APPOINTMENT (OUTPATIENT)
Dept: CARDIOLOGY | Facility: CLINIC | Age: 70
End: 2020-09-10

## 2020-09-10 ENCOUNTER — APPOINTMENT (OUTPATIENT)
Dept: INTERNAL MEDICINE | Facility: CLINIC | Age: 70
End: 2020-09-10
Payer: MEDICARE

## 2020-09-10 ENCOUNTER — RESULT REVIEW (OUTPATIENT)
Age: 70
End: 2020-09-10

## 2020-09-10 VITALS
BODY MASS INDEX: 19.76 KG/M2 | SYSTOLIC BLOOD PRESSURE: 124 MMHG | TEMPERATURE: 97.8 F | OXYGEN SATURATION: 97 % | HEART RATE: 85 BPM | WEIGHT: 98 LBS | HEIGHT: 59 IN | DIASTOLIC BLOOD PRESSURE: 63 MMHG

## 2020-09-10 VITALS
WEIGHT: 98 LBS | TEMPERATURE: 97.8 F | OXYGEN SATURATION: 97 % | BODY MASS INDEX: 19.76 KG/M2 | HEIGHT: 59 IN | SYSTOLIC BLOOD PRESSURE: 124 MMHG | HEART RATE: 85 BPM | DIASTOLIC BLOOD PRESSURE: 63 MMHG

## 2020-09-10 DIAGNOSIS — R07.9 CHEST PAIN, UNSPECIFIED: ICD-10-CM

## 2020-09-10 DIAGNOSIS — Z98.891 HISTORY OF UTERINE SCAR FROM PREVIOUS SURGERY: Chronic | ICD-10-CM

## 2020-09-10 PROCEDURE — 99203 OFFICE O/P NEW LOW 30 MIN: CPT

## 2020-09-10 PROCEDURE — G0008: CPT

## 2020-09-10 PROCEDURE — 90662 IIV NO PRSV INCREASED AG IM: CPT

## 2020-09-10 PROCEDURE — 75574 CT ANGIO HRT W/3D IMAGE: CPT | Mod: 26

## 2020-09-10 PROCEDURE — 75574 CT ANGIO HRT W/3D IMAGE: CPT

## 2020-09-10 PROCEDURE — G0439: CPT

## 2020-09-10 NOTE — HISTORY OF PRESENT ILLNESS
[FreeTextEntry1] : Presents for preventive visit. [de-identified] : \par Preventive visit: pt is in need of flu vaccine today; she is up to date with colonoscopy screening and mammogram screening; she does not smoke cigarettes and does not misuse alcohol; she feels safe at home and has smoke/CO detectors in the house; she wears seatbelts when in vehicles; she sees her GYN for PAP/pelvic exams

## 2020-09-10 NOTE — HEALTH RISK ASSESSMENT
[Good] : ~his/her~ current health as good [] : No [No] : No [Never (0 pts)] : Never (0 points) [1 or 2 (0 pts)] : 1 or 2 (0 points) [Audit-CScore] : 0 [Change in mental status noted] : No change in mental status noted [No falls in past year] : Patient reported no falls in the past year [Behavior] : denies difficulty with behavior [Language] : denies difficulty with language [Reasoning] : denies difficulty with reasoning [Learning/Retaining New Information] : denies difficulty learning/retaining new information [Handling Complex Tasks] : denies difficulty handling complex tasks [None] : None [Spatial Ability and Orientation] : denies difficulty with spatial ability and orientation [With Family] : lives with family [Fully functional (bathing, dressing, toileting, transferring, walking, feeding)] : Fully functional (bathing, dressing, toileting, transferring, walking, feeding) [Reports changes in hearing] : Reports no changes in hearing [Fully functional (using the telephone, shopping, preparing meals, housekeeping, doing laundry, using] : Fully functional and needs no help or supervision to perform IADLs (using the telephone, shopping, preparing meals, housekeeping, doing laundry, using transportation, managing medications and managing finances) [Reports changes in vision] : Reports no changes in vision [Reports normal functional visual acuity (ie: able to read med bottle)] : Reports normal functional visual acuity [Smoke Detector] : smoke detector [Reports changes in dental health] : Reports no changes in dental health [Guns at Home] : no guns at home [Carbon Monoxide Detector] : carbon monoxide detector [Safety elements used in home] : safety elements used in home [Sunscreen] : uses sunscreen [Seat Belt] :  uses seat belt [TB Exposure] : is not being exposed to tuberculosis [Travel to Developing Areas] : does not  travel to developing areas [Caregiver Concerns] : does not have caregiver concerns [AdvancecareDate] : 09/20

## 2020-09-10 NOTE — PHYSICAL EXAM
[No Acute Distress] : no acute distress [Well Nourished] : well nourished [Well Developed] : well developed [Well-Appearing] : well-appearing [Normal Sclera/Conjunctiva] : normal sclera/conjunctiva [No Lymphadenopathy] : no lymphadenopathy [Supple] : supple [No Respiratory Distress] : no respiratory distress  [No Accessory Muscle Use] : no accessory muscle use [Clear to Auscultation] : lungs were clear to auscultation bilaterally [Normal Rate] : normal rate  [Regular Rhythm] : with a regular rhythm [Normal S1, S2] : normal S1 and S2 [No Murmur] : no murmur heard [No Carotid Bruits] : no carotid bruits [No Abdominal Bruit] : a ~M bruit was not heard ~T in the abdomen [No Varicosities] : no varicosities [Pedal Pulses Present] : the pedal pulses are present [No Edema] : there was no peripheral edema [No Palpable Aorta] : no palpable aorta [Normal Appearance] : normal in appearance [No Extremity Clubbing/Cyanosis] : no extremity clubbing/cyanosis [No Nipple Discharge] : no nipple discharge [No Axillary Lymphadenopathy] : no axillary lymphadenopathy [Soft] : abdomen soft [Non Tender] : non-tender [Non-distended] : non-distended [No Masses] : no abdominal mass palpated [No HSM] : no HSM [Normal Bowel Sounds] : normal bowel sounds [Normal Posterior Cervical Nodes] : no posterior cervical lymphadenopathy [Normal Anterior Cervical Nodes] : no anterior cervical lymphadenopathy [No CVA Tenderness] : no CVA  tenderness [No Spinal Tenderness] : no spinal tenderness [No Joint Swelling] : no joint swelling [Grossly Normal Strength/Tone] : grossly normal strength/tone [No Rash] : no rash [Coordination Grossly Intact] : coordination grossly intact [No Focal Deficits] : no focal deficits [Normal Gait] : normal gait [Normal Affect] : the affect was normal [Normal Insight/Judgement] : insight and judgment were intact

## 2020-09-10 NOTE — ASSESSMENT
[FreeTextEntry1] : \par Preventive visit: flu vaccine today; she is up to date with colonoscopy screening and mammogram screening; she does not smoke cigarettes and does not misuse alcohol; she feels safe at home and has smoke/CO detectors in the house; she wears seatbelts when in vehicles; she sees her GYN for PAP/pelvic exams

## 2020-09-11 ENCOUNTER — LABORATORY RESULT (OUTPATIENT)
Age: 70
End: 2020-09-11

## 2020-09-14 ENCOUNTER — APPOINTMENT (OUTPATIENT)
Dept: CARDIOLOGY | Facility: CLINIC | Age: 70
End: 2020-09-14
Payer: MEDICARE

## 2020-09-14 PROCEDURE — 93880 EXTRACRANIAL BILAT STUDY: CPT

## 2020-09-18 LAB
APPEARANCE: CLEAR
BILIRUBIN URINE: NEGATIVE
BLOOD URINE: ABNORMAL
COLOR: YELLOW
GLUCOSE QUALITATIVE U: NEGATIVE
KETONES URINE: ABNORMAL
LEUKOCYTE ESTERASE URINE: ABNORMAL
NITRITE URINE: NEGATIVE
PH URINE: 6
PROTEIN URINE: NORMAL
SPECIFIC GRAVITY URINE: ABNORMAL
UROBILINOGEN URINE: NORMAL

## 2020-09-26 ENCOUNTER — OUTPATIENT (OUTPATIENT)
Dept: OUTPATIENT SERVICES | Facility: HOSPITAL | Age: 70
LOS: 1 days | End: 2020-09-26
Payer: MEDICARE

## 2020-09-26 ENCOUNTER — APPOINTMENT (OUTPATIENT)
Dept: ULTRASOUND IMAGING | Facility: IMAGING CENTER | Age: 70
End: 2020-09-26
Payer: MEDICARE

## 2020-09-26 DIAGNOSIS — Z00.8 ENCOUNTER FOR OTHER GENERAL EXAMINATION: ICD-10-CM

## 2020-09-26 DIAGNOSIS — Z98.891 HISTORY OF UTERINE SCAR FROM PREVIOUS SURGERY: Chronic | ICD-10-CM

## 2020-09-26 PROCEDURE — 76705 ECHO EXAM OF ABDOMEN: CPT

## 2020-09-26 PROCEDURE — 76705 ECHO EXAM OF ABDOMEN: CPT | Mod: 26,RT

## 2020-10-13 NOTE — PROGRESS NOTE ADULT - ASSESSMENT
68yoF with history of pulmonary AVM, RA and bronchiectasis admitted for monitoring after AVM embolization.
NSR no acute ischemia changes

## 2020-11-07 ENCOUNTER — APPOINTMENT (OUTPATIENT)
Dept: DISASTER EMERGENCY | Facility: CLINIC | Age: 70
End: 2020-11-07

## 2020-11-08 LAB — SARS-COV-2 N GENE NPH QL NAA+PROBE: NOT DETECTED

## 2020-11-10 ENCOUNTER — OUTPATIENT (OUTPATIENT)
Dept: OUTPATIENT SERVICES | Facility: HOSPITAL | Age: 70
LOS: 1 days | End: 2020-11-10
Payer: MEDICARE

## 2020-11-10 ENCOUNTER — RESULT REVIEW (OUTPATIENT)
Age: 70
End: 2020-11-10

## 2020-11-10 VITALS
OXYGEN SATURATION: 99 % | RESPIRATION RATE: 17 BRPM | DIASTOLIC BLOOD PRESSURE: 56 MMHG | HEART RATE: 76 BPM | SYSTOLIC BLOOD PRESSURE: 113 MMHG

## 2020-11-10 VITALS
TEMPERATURE: 98 F | HEART RATE: 80 BPM | WEIGHT: 98.11 LBS | DIASTOLIC BLOOD PRESSURE: 70 MMHG | SYSTOLIC BLOOD PRESSURE: 131 MMHG | RESPIRATION RATE: 16 BRPM | OXYGEN SATURATION: 100 % | HEIGHT: 59 IN

## 2020-11-10 DIAGNOSIS — Z98.891 HISTORY OF UTERINE SCAR FROM PREVIOUS SURGERY: Chronic | ICD-10-CM

## 2020-11-10 DIAGNOSIS — R91.8 OTHER NONSPECIFIC ABNORMAL FINDING OF LUNG FIELD: ICD-10-CM

## 2020-11-10 LAB
GRAM STN FLD: SIGNIFICANT CHANGE UP
NIGHT BLUE STAIN TISS: SIGNIFICANT CHANGE UP
SPECIMEN SOURCE: SIGNIFICANT CHANGE UP
SPECIMEN SOURCE: SIGNIFICANT CHANGE UP

## 2020-11-10 PROCEDURE — 88305 TISSUE EXAM BY PATHOLOGIST: CPT

## 2020-11-10 PROCEDURE — 87102 FUNGUS ISOLATION CULTURE: CPT

## 2020-11-10 PROCEDURE — 88305 TISSUE EXAM BY PATHOLOGIST: CPT | Mod: 26

## 2020-11-10 PROCEDURE — 88312 SPECIAL STAINS GROUP 1: CPT | Mod: 26,59

## 2020-11-10 PROCEDURE — 88112 CYTOPATH CELL ENHANCE TECH: CPT | Mod: 26

## 2020-11-10 PROCEDURE — 87015 SPECIMEN INFECT AGNT CONCNTJ: CPT

## 2020-11-10 PROCEDURE — 31624 DX BRONCHOSCOPE/LAVAGE: CPT

## 2020-11-10 PROCEDURE — 87206 SMEAR FLUORESCENT/ACID STAI: CPT

## 2020-11-10 PROCEDURE — 87070 CULTURE OTHR SPECIMN AEROBIC: CPT

## 2020-11-10 PROCEDURE — 87116 MYCOBACTERIA CULTURE: CPT

## 2020-11-10 PROCEDURE — 88112 CYTOPATH CELL ENHANCE TECH: CPT

## 2020-11-10 PROCEDURE — 88312 SPECIAL STAINS GROUP 1: CPT

## 2020-11-10 RX ORDER — SODIUM CHLORIDE 9 MG/ML
1000 INJECTION INTRAMUSCULAR; INTRAVENOUS; SUBCUTANEOUS
Refills: 0 | Status: DISCONTINUED | OUTPATIENT
Start: 2020-11-10 | End: 2020-11-24

## 2020-11-10 RX ORDER — ETANERCEPT 25 MG
1 VIAL (EA) SUBCUTANEOUS
Qty: 0 | Refills: 0 | DISCHARGE

## 2020-11-10 RX ORDER — METHOTREXATE 2.5 MG/1
3 TABLET ORAL
Qty: 0 | Refills: 0 | DISCHARGE

## 2020-11-10 RX ORDER — ALENDRONATE SODIUM 70 MG/1
1 TABLET ORAL
Qty: 0 | Refills: 0 | DISCHARGE

## 2020-11-10 RX ADMIN — SODIUM CHLORIDE 30 MILLILITER(S): 9 INJECTION INTRAMUSCULAR; INTRAVENOUS; SUBCUTANEOUS at 12:30

## 2020-11-10 NOTE — PRE-ANESTHESIA EVALUATION ADULT - NSANTHOSAYNRD_GEN_A_CORE
No. SMITA screening performed.  STOP BANG Legend: 0-2 = LOW Risk; 3-4 = INTERMEDIATE Risk; 5-8 = HIGH Risk
No. SMITA screening performed.  STOP BANG Legend: 0-2 = LOW Risk; 3-4 = INTERMEDIATE Risk; 5-8 = HIGH Risk

## 2020-11-10 NOTE — PRE PROCEDURE NOTE - PRE PROCEDURE EVALUATION
Attending Physician: Josep Carrillo                      Procedure: Bronchoscopy    Indication for Procedure: Bronchiectasis - r/o LAURA  ________________________________________________________  PAST MEDICAL & SURGICAL HISTORY:  Rheumatoid arthritis    H/O migraine    PFO (patent foramen ovale)  as per patient is resolved    MVP (mitral valve prolapse)    Bronchiectasis    S/P   X 2      ALLERGIES:  No Known Allergies    HOME MEDICATIONS:  acetaminophen 325 mg oral tablet: 2 tab(s) orally every 6 hours, As needed, Moderate Pain (4 - 6)  alendronate 70 mg oral tablet: 1 tab(s) orally once a week  Calcium 600+D 600 mg-200 intl units oral tablet: 1 tab(s) orally once a day  Enbrel 25 mg subcutaneous kit: 1 dose(s) subcutaneous 2 times a week  methotrexate 2.5 mg oral tablet: 3 tab(s) orally once a week    AICD/PPM: [ ] yes   [ ] no    PERTINENT LAB DATA:                      PHYSICAL EXAMINATION:    T(C): 36.6  HR: 80  BP: 131/70  RR: 16  SpO2: 100%    Constitutional: NAD    Neck:  No JVD  Respiratory: CTAB/L  Cardiovascular: S1 and S2  Gastrointestinal: BS+, soft, NT/ND  Extremities: No peripheral edema  Neurological: A/O x 3, no focal deficits        COMMENTS:    The patient is a suitable candidate for the planned procedure unless box checked [ ]  No, explain:

## 2020-11-12 LAB
CULTURE RESULTS: SIGNIFICANT CHANGE UP
SPECIMEN SOURCE: SIGNIFICANT CHANGE UP

## 2020-11-28 NOTE — H&P PST ADULT - MEDICATION ADMINISTRATION INFO, PROFILE
Respiratory symptoms  Booked by [facility] in my clinic when we are not seeing respiratory symptoms because of high covid rates in the area    Referred to urgent care   no concerns

## 2020-12-09 LAB
CULTURE RESULTS: SIGNIFICANT CHANGE UP
SPECIMEN SOURCE: SIGNIFICANT CHANGE UP

## 2020-12-30 LAB
CULTURE RESULTS: SIGNIFICANT CHANGE UP
SPECIMEN SOURCE: SIGNIFICANT CHANGE UP

## 2021-01-08 ENCOUNTER — APPOINTMENT (OUTPATIENT)
Dept: INTERNAL MEDICINE | Facility: CLINIC | Age: 71
End: 2021-01-08

## 2021-02-09 ENCOUNTER — APPOINTMENT (OUTPATIENT)
Dept: INTERNAL MEDICINE | Facility: CLINIC | Age: 71
End: 2021-02-09
Payer: MEDICARE

## 2021-02-09 VITALS
OXYGEN SATURATION: 96 % | WEIGHT: 95 LBS | TEMPERATURE: 97 F | BODY MASS INDEX: 19.15 KG/M2 | HEIGHT: 59 IN | HEART RATE: 98 BPM | SYSTOLIC BLOOD PRESSURE: 114 MMHG | DIASTOLIC BLOOD PRESSURE: 74 MMHG

## 2021-02-09 PROCEDURE — G0439: CPT

## 2021-02-09 NOTE — ASSESSMENT
[FreeTextEntry1] : \par Preventive visit: flu vaccine up to date; she is up to date with colonoscopy screening and mammogram screening; she does not smoke cigarettes and does not misuse alcohol; she feels safe at home and has smoke/CO detectors in the house; she wears seatbelts when in vehicles; she sees her GYN for PAP/pelvic exams

## 2021-02-09 NOTE — HEALTH RISK ASSESSMENT
[Good] : ~his/her~  mood as  good [] : No [No] : No [1 or 2 (0 pts)] : 1 or 2 (0 points) [Never (0 pts)] : Never (0 points) [No falls in past year] : Patient reported no falls in the past year [Audit-CScore] : 0 [Change in mental status noted] : No change in mental status noted [Language] : denies difficulty with language [Learning/Retaining New Information] : denies difficulty learning/retaining new information [Behavior] : denies difficulty with behavior [Handling Complex Tasks] : denies difficulty handling complex tasks [Reasoning] : denies difficulty with reasoning [Spatial Ability and Orientation] : denies difficulty with spatial ability and orientation [None] : None [With Family] : lives with family [Fully functional (bathing, dressing, toileting, transferring, walking, feeding)] : Fully functional (bathing, dressing, toileting, transferring, walking, feeding) [Fully functional (using the telephone, shopping, preparing meals, housekeeping, doing laundry, using] : Fully functional and needs no help or supervision to perform IADLs (using the telephone, shopping, preparing meals, housekeeping, doing laundry, using transportation, managing medications and managing finances) [Reports changes in hearing] : Reports no changes in hearing [Reports changes in vision] : Reports no changes in vision [Reports normal functional visual acuity (ie: able to read med bottle)] : Reports normal functional visual acuity [Reports changes in dental health] : Reports no changes in dental health [Smoke Detector] : smoke detector [Carbon Monoxide Detector] : carbon monoxide detector [Guns at Home] : no guns at home [Safety elements used in home] : safety elements used in home [Seat Belt] :  uses seat belt [Sunscreen] : uses sunscreen [Travel to Developing Areas] : does not  travel to developing areas [TB Exposure] : is not being exposed to tuberculosis [Caregiver Concerns] : does not have caregiver concerns [AdvancecareDate] : 09/20

## 2021-02-09 NOTE — HISTORY OF PRESENT ILLNESS
[FreeTextEntry1] : Presents for preventive visit. [de-identified] : \par Preventive visit: pt is in process of getting COVID vaccine; she is up to date with colonoscopy screening and mammogram screening; she does not smoke cigarettes and does not misuse alcohol; she feels safe at home and has smoke/CO detectors in the house; she wears seatbelts when in vehicles; she sees her GYN for PAP/pelvic exams

## 2021-02-09 NOTE — HISTORY OF PRESENT ILLNESS
[FreeTextEntry1] : Presents for preventive visit. [de-identified] : \par Preventive visit: pt is in process of getting COVID vaccine; she is up to date with colonoscopy screening and mammogram screening; she does not smoke cigarettes and does not misuse alcohol; she feels safe at home and has smoke/CO detectors in the house; she wears seatbelts when in vehicles; she sees her GYN for PAP/pelvic exams

## 2021-02-09 NOTE — PHYSICAL EXAM
[Well Nourished] : well nourished [Well Developed] : well developed [Well-Appearing] : well-appearing [Normal Sclera/Conjunctiva] : normal sclera/conjunctiva [No Lymphadenopathy] : no lymphadenopathy [Supple] : supple [No Respiratory Distress] : no respiratory distress  [No Accessory Muscle Use] : no accessory muscle use [Clear to Auscultation] : lungs were clear to auscultation bilaterally [Regular Rhythm] : with a regular rhythm [Normal Rate] : normal rate  [Normal S1, S2] : normal S1 and S2 [No Murmur] : no murmur heard [No Carotid Bruits] : no carotid bruits [No Abdominal Bruit] : a ~M bruit was not heard ~T in the abdomen [No Varicosities] : no varicosities [Pedal Pulses Present] : the pedal pulses are present [No Palpable Aorta] : no palpable aorta [No Edema] : there was no peripheral edema [No Extremity Clubbing/Cyanosis] : no extremity clubbing/cyanosis [Soft] : abdomen soft [Non Tender] : non-tender [Non-distended] : non-distended [No Masses] : no abdominal mass palpated [No HSM] : no HSM [Normal Bowel Sounds] : normal bowel sounds [Normal Posterior Cervical Nodes] : no posterior cervical lymphadenopathy [Normal Anterior Cervical Nodes] : no anterior cervical lymphadenopathy [No CVA Tenderness] : no CVA  tenderness [No Spinal Tenderness] : no spinal tenderness [No Joint Swelling] : no joint swelling [Grossly Normal Strength/Tone] : grossly normal strength/tone [No Rash] : no rash [Coordination Grossly Intact] : coordination grossly intact [No Focal Deficits] : no focal deficits [Normal Gait] : normal gait [Normal Affect] : the affect was normal [Normal Insight/Judgement] : insight and judgment were intact

## 2021-02-09 NOTE — PHYSICAL EXAM
[Well Nourished] : well nourished [Well Developed] : well developed [Well-Appearing] : well-appearing [Normal Sclera/Conjunctiva] : normal sclera/conjunctiva [No Lymphadenopathy] : no lymphadenopathy [Supple] : supple [No Respiratory Distress] : no respiratory distress  [No Accessory Muscle Use] : no accessory muscle use [Clear to Auscultation] : lungs were clear to auscultation bilaterally [Regular Rhythm] : with a regular rhythm [Normal Rate] : normal rate  [Normal S1, S2] : normal S1 and S2 [No Murmur] : no murmur heard [No Carotid Bruits] : no carotid bruits [No Abdominal Bruit] : a ~M bruit was not heard ~T in the abdomen [No Varicosities] : no varicosities [Pedal Pulses Present] : the pedal pulses are present [No Palpable Aorta] : no palpable aorta [No Edema] : there was no peripheral edema [No Extremity Clubbing/Cyanosis] : no extremity clubbing/cyanosis [Soft] : abdomen soft [Non Tender] : non-tender [Non-distended] : non-distended [No Masses] : no abdominal mass palpated [No HSM] : no HSM [Normal Bowel Sounds] : normal bowel sounds [Normal Posterior Cervical Nodes] : no posterior cervical lymphadenopathy [Normal Anterior Cervical Nodes] : no anterior cervical lymphadenopathy [No CVA Tenderness] : no CVA  tenderness [No Spinal Tenderness] : no spinal tenderness [No Joint Swelling] : no joint swelling [No Rash] : no rash [Grossly Normal Strength/Tone] : grossly normal strength/tone [Coordination Grossly Intact] : coordination grossly intact [No Focal Deficits] : no focal deficits [Normal Gait] : normal gait [Normal Affect] : the affect was normal [Normal Insight/Judgement] : insight and judgment were intact

## 2021-02-17 ENCOUNTER — TRANSCRIPTION ENCOUNTER (OUTPATIENT)
Age: 71
End: 2021-02-17

## 2021-02-17 LAB
CHOLEST SERPL-MCNC: 214 MG/DL
HDLC SERPL-MCNC: 82 MG/DL
LDLC SERPL CALC-MCNC: 123 MG/DL
NONHDLC SERPL-MCNC: 132 MG/DL
TRIGL SERPL-MCNC: 47 MG/DL

## 2021-02-22 ENCOUNTER — TRANSCRIPTION ENCOUNTER (OUTPATIENT)
Age: 71
End: 2021-02-22

## 2021-05-24 ENCOUNTER — TRANSCRIPTION ENCOUNTER (OUTPATIENT)
Age: 71
End: 2021-05-24

## 2021-05-26 ENCOUNTER — TRANSCRIPTION ENCOUNTER (OUTPATIENT)
Age: 71
End: 2021-05-26

## 2021-06-18 NOTE — REVIEW OF SYSTEMS
Procedure:  EXAM UNDER ANESTHESIA (EUA) (N/A Pelvis)  Kylena IUD Insertion under anesthesia (N/A Uterus)    Relevant Problems   NEURO/PSYCH   (+) Anxiety disorder   (+) Depression        Physical Exam    Airway    Mallampati score: I  TM Distance: >3 FB  Neck ROM: full     Dental       Cardiovascular      Pulmonary      Other Findings        Anesthesia Plan  ASA Score- 1     Anesthesia Type- IV sedation with anesthesia with ASA Monitors  Additional Monitors:   Airway Plan:           Plan Factors-Exercise tolerance (METS): >4 METS  Chart reviewed  Existing labs reviewed  Patient summary reviewed  Induction- intravenous  Postoperative Plan-     Informed Consent- Anesthetic plan and risks discussed with patient  I personally reviewed this patient with the CRNA  Discussed and agreed on the Anesthesia Plan with the CRNA  Sherre Soulier [Cough] : cough [see HPI] : see HPI [Joint Pain] : joint pain [Negative] : Heme/Lymph

## 2021-06-28 ENCOUNTER — APPOINTMENT (OUTPATIENT)
Dept: DERMATOLOGY | Facility: CLINIC | Age: 71
End: 2021-06-28
Payer: MEDICARE

## 2021-06-28 PROCEDURE — 99204 OFFICE O/P NEW MOD 45 MIN: CPT | Mod: 25

## 2021-06-28 PROCEDURE — 17110 DESTRUCTION B9 LES UP TO 14: CPT

## 2021-06-28 NOTE — ASU PREOP CHECKLIST - TYPE OF SOLUTION
normal saline

## 2021-08-09 ENCOUNTER — APPOINTMENT (OUTPATIENT)
Dept: INTERNAL MEDICINE | Facility: CLINIC | Age: 71
End: 2021-08-09
Payer: MEDICARE

## 2021-08-09 VITALS
DIASTOLIC BLOOD PRESSURE: 63 MMHG | OXYGEN SATURATION: 98 % | WEIGHT: 96 LBS | BODY MASS INDEX: 19.35 KG/M2 | HEIGHT: 59 IN | SYSTOLIC BLOOD PRESSURE: 111 MMHG | HEART RATE: 79 BPM | TEMPERATURE: 98 F

## 2021-08-09 PROCEDURE — 90750 HZV VACC RECOMBINANT IM: CPT | Mod: GY

## 2021-08-09 PROCEDURE — 99213 OFFICE O/P EST LOW 20 MIN: CPT | Mod: 25

## 2021-08-09 PROCEDURE — 90471 IMMUNIZATION ADMIN: CPT

## 2021-08-09 NOTE — ASSESSMENT
[FreeTextEntry1] : \par Diverticulosis: advised against excessive seeds in diet and coordinated follow-up care with GI\par \par HCM: Shingrix #1 today, return in 6 months for shot #2

## 2021-08-09 NOTE — HISTORY OF PRESENT ILLNESS
[FreeTextEntry1] : Follow-up for diverticulosis and needs Shingrix vaccine. [de-identified] : \par Diverticulosis: stable but still causing intermittent spasms, seeing GI, had colonoscopy, no suspicious lesions\par \par

## 2021-08-24 NOTE — ASSESSMENT
How Severe Is Your Skin Lesion?: mild
[FreeTextEntry1] : Patient is a 69-year-old female history of rheumatoid arthritis, osteoporosis with complaints of right groin pain after coiling procedure for a pulmonary AVM (June 2019). Clinical exam noted for diminished right hip external rotation with hard end point and significant groin pain with passive external rotation. Imaging negative for osteoarthritis, possible labrum injury. MRI right hip pending. Will initiate outpatient physical therapy, see RX. No NSAIDs, discussed option of standing regimen of Tylenol which was declined for now.
Has Your Skin Lesion Been Treated?: not been treated
Is This A New Presentation, Or A Follow-Up?: Skin Lesion

## 2021-09-10 ENCOUNTER — APPOINTMENT (OUTPATIENT)
Dept: CARDIOLOGY | Facility: CLINIC | Age: 71
End: 2021-09-10
Payer: MEDICARE

## 2021-09-10 ENCOUNTER — NON-APPOINTMENT (OUTPATIENT)
Age: 71
End: 2021-09-10

## 2021-09-10 VITALS
OXYGEN SATURATION: 100 % | DIASTOLIC BLOOD PRESSURE: 70 MMHG | BODY MASS INDEX: 19.59 KG/M2 | HEART RATE: 74 BPM | SYSTOLIC BLOOD PRESSURE: 130 MMHG | WEIGHT: 97 LBS

## 2021-09-10 PROCEDURE — 99214 OFFICE O/P EST MOD 30 MIN: CPT

## 2021-09-10 PROCEDURE — 93000 ELECTROCARDIOGRAM COMPLETE: CPT

## 2021-10-05 ENCOUNTER — OUTPATIENT (OUTPATIENT)
Dept: OUTPATIENT SERVICES | Facility: HOSPITAL | Age: 71
LOS: 1 days | End: 2021-10-05
Payer: MEDICARE

## 2021-10-05 ENCOUNTER — APPOINTMENT (OUTPATIENT)
Dept: ULTRASOUND IMAGING | Facility: CLINIC | Age: 71
End: 2021-10-05
Payer: MEDICARE

## 2021-10-05 DIAGNOSIS — Z98.891 HISTORY OF UTERINE SCAR FROM PREVIOUS SURGERY: Chronic | ICD-10-CM

## 2021-10-05 DIAGNOSIS — Z00.8 ENCOUNTER FOR OTHER GENERAL EXAMINATION: ICD-10-CM

## 2021-10-05 PROCEDURE — 76705 ECHO EXAM OF ABDOMEN: CPT | Mod: 26

## 2021-10-05 PROCEDURE — 76705 ECHO EXAM OF ABDOMEN: CPT

## 2022-02-11 ENCOUNTER — APPOINTMENT (OUTPATIENT)
Dept: INTERNAL MEDICINE | Facility: CLINIC | Age: 72
End: 2022-02-11
Payer: MEDICARE

## 2022-02-11 VITALS
HEIGHT: 58.5 IN | DIASTOLIC BLOOD PRESSURE: 69 MMHG | HEART RATE: 83 BPM | SYSTOLIC BLOOD PRESSURE: 111 MMHG | WEIGHT: 96.31 LBS | BODY MASS INDEX: 19.68 KG/M2 | TEMPERATURE: 97.5 F | OXYGEN SATURATION: 100 %

## 2022-02-11 DIAGNOSIS — M06.9 RHEUMATOID ARTHRITIS, UNSPECIFIED: ICD-10-CM

## 2022-02-11 DIAGNOSIS — J47.9 BRONCHIECTASIS, UNCOMPLICATED: ICD-10-CM

## 2022-02-11 PROCEDURE — G0442 ANNUAL ALCOHOL SCREEN 15 MIN: CPT | Mod: 59

## 2022-02-11 PROCEDURE — G0439: CPT

## 2022-02-11 PROCEDURE — G0444 DEPRESSION SCREEN ANNUAL: CPT | Mod: 59

## 2022-02-11 NOTE — PHYSICAL EXAM
[Well Nourished] : well nourished [Well Developed] : well developed [Well-Appearing] : well-appearing [Normal Sclera/Conjunctiva] : normal sclera/conjunctiva [No Lymphadenopathy] : no lymphadenopathy [Supple] : supple [No Respiratory Distress] : no respiratory distress  [No Accessory Muscle Use] : no accessory muscle use [Clear to Auscultation] : lungs were clear to auscultation bilaterally [Normal Rate] : normal rate  [Regular Rhythm] : with a regular rhythm [Normal S1, S2] : normal S1 and S2 [No Murmur] : no murmur heard [No Carotid Bruits] : no carotid bruits [No Abdominal Bruit] : a ~M bruit was not heard ~T in the abdomen [No Varicosities] : no varicosities [Pedal Pulses Present] : the pedal pulses are present [No Edema] : there was no peripheral edema [No Palpable Aorta] : no palpable aorta [No Extremity Clubbing/Cyanosis] : no extremity clubbing/cyanosis [Soft] : abdomen soft [Non Tender] : non-tender [Non-distended] : non-distended [No Masses] : no abdominal mass palpated [No HSM] : no HSM [Normal Bowel Sounds] : normal bowel sounds [Normal Posterior Cervical Nodes] : no posterior cervical lymphadenopathy [Normal Anterior Cervical Nodes] : no anterior cervical lymphadenopathy [No CVA Tenderness] : no CVA  tenderness [No Spinal Tenderness] : no spinal tenderness [No Joint Swelling] : no joint swelling [Grossly Normal Strength/Tone] : grossly normal strength/tone [No Rash] : no rash [Coordination Grossly Intact] : coordination grossly intact [No Focal Deficits] : no focal deficits [Normal Gait] : normal gait [Normal Affect] : the affect was normal [Normal Insight/Judgement] : insight and judgment were intact

## 2022-02-16 ENCOUNTER — TRANSCRIPTION ENCOUNTER (OUTPATIENT)
Age: 72
End: 2022-02-16

## 2022-02-16 DIAGNOSIS — Z13.39 ENCOUNTER FOR SCREENING EXAM FOR OTHER MENTAL HEALTH AND BEHAVIORAL DISORDERS: ICD-10-CM

## 2022-02-16 DIAGNOSIS — Z87.39 PERSONAL HISTORY OF OTHER DISEASES OF THE MUSCULOSKELETAL SYSTEM AND CONNECTIVE TISSUE: ICD-10-CM

## 2022-02-16 DIAGNOSIS — Z13.31 ENCOUNTER FOR SCREENING FOR DEPRESSION: ICD-10-CM

## 2022-02-16 DIAGNOSIS — R06.89 OTHER ABNORMALITIES OF BREATHING: ICD-10-CM

## 2022-02-16 DIAGNOSIS — R93.89 ABNORMAL FINDINGS ON DIAGNOSTIC IMAGING OF OTHER SPECIFIED BODY STRUCTURES: ICD-10-CM

## 2022-02-16 DIAGNOSIS — Z23 ENCOUNTER FOR IMMUNIZATION: ICD-10-CM

## 2022-02-16 DIAGNOSIS — R09.89 OTHER SPECIFIED SYMPTOMS AND SIGNS INVOLVING THE CIRCULATORY AND RESPIRATORY SYSTEMS: ICD-10-CM

## 2022-02-16 DIAGNOSIS — R10.9 UNSPECIFIED ABDOMINAL PAIN: ICD-10-CM

## 2022-02-16 LAB
25(OH)D3 SERPL-MCNC: 45.8 NG/ML
ALBUMIN SERPL ELPH-MCNC: 4.5 G/DL
ALP BLD-CCNC: 40 U/L
ALT SERPL-CCNC: 17 U/L
ANION GAP SERPL CALC-SCNC: 12 MMOL/L
AST SERPL-CCNC: 18 U/L
BASOPHILS # BLD AUTO: 0.03 K/UL
BASOPHILS NFR BLD AUTO: 0.6 %
BILIRUB SERPL-MCNC: 0.5 MG/DL
BUN SERPL-MCNC: 12 MG/DL
CALCIUM SERPL-MCNC: 10.1 MG/DL
CHLORIDE SERPL-SCNC: 106 MMOL/L
CHOLEST SERPL-MCNC: 216 MG/DL
CO2 SERPL-SCNC: 26 MMOL/L
CREAT SERPL-MCNC: 0.64 MG/DL
EOSINOPHIL # BLD AUTO: 0.11 K/UL
EOSINOPHIL NFR BLD AUTO: 2.1 %
ESTIMATED AVERAGE GLUCOSE: 108 MG/DL
GLUCOSE SERPL-MCNC: 86 MG/DL
HBA1C MFR BLD HPLC: 5.4 %
HCT VFR BLD CALC: 42.7 %
HDLC SERPL-MCNC: 82 MG/DL
HGB BLD-MCNC: 13.7 G/DL
IMM GRANULOCYTES NFR BLD AUTO: 0.2 %
LDLC SERPL CALC-MCNC: 121 MG/DL
LYMPHOCYTES # BLD AUTO: 1.37 K/UL
LYMPHOCYTES NFR BLD AUTO: 26 %
MAN DIFF?: NORMAL
MCHC RBC-ENTMCNC: 31 PG
MCHC RBC-ENTMCNC: 32.1 GM/DL
MCV RBC AUTO: 96.6 FL
MONOCYTES # BLD AUTO: 0.39 K/UL
MONOCYTES NFR BLD AUTO: 7.4 %
NEUTROPHILS # BLD AUTO: 3.36 K/UL
NEUTROPHILS NFR BLD AUTO: 63.7 %
NONHDLC SERPL-MCNC: 135 MG/DL
PLATELET # BLD AUTO: 215 K/UL
POTASSIUM SERPL-SCNC: 4.9 MMOL/L
PROT SERPL-MCNC: 6.5 G/DL
RBC # BLD: 4.42 M/UL
RBC # FLD: 13.1 %
SODIUM SERPL-SCNC: 144 MMOL/L
TRIGL SERPL-MCNC: 68 MG/DL
TSH SERPL-ACNC: 0.29 UIU/ML
WBC # FLD AUTO: 5.27 K/UL

## 2022-02-16 NOTE — ASSESSMENT
[FreeTextEntry1] : \par Preventive visit: flu vaccine up to date; she is up to date with colonoscopy screening and mammogram screening; she does not smoke cigarettes and does not misuse alcohol; she feels safe at home and has smoke/CO detectors in the house; she wears seatbelts when in vehicles; she sees her GYN for PAP/pelvic exams\par \par Depression screen performed today, PHQ2=0, negative.\par \par Annual alcohol misuse screen, 15 mins, done; negative.\par

## 2022-02-16 NOTE — HEALTH RISK ASSESSMENT
[Good] : ~his/her~  mood as  good [No] : No [1 or 2 (0 pts)] : 1 or 2 (0 points) [Never (0 pts)] : Never (0 points) [No falls in past year] : Patient reported no falls in the past year [None] : None [With Family] : lives with family [Fully functional (bathing, dressing, toileting, transferring, walking, feeding)] : Fully functional (bathing, dressing, toileting, transferring, walking, feeding) [Fully functional (using the telephone, shopping, preparing meals, housekeeping, doing laundry, using] : Fully functional and needs no help or supervision to perform IADLs (using the telephone, shopping, preparing meals, housekeeping, doing laundry, using transportation, managing medications and managing finances) [Reports normal functional visual acuity (ie: able to read med bottle)] : Reports normal functional visual acuity [Smoke Detector] : smoke detector [Carbon Monoxide Detector] : carbon monoxide detector [Safety elements used in home] : safety elements used in home [Seat Belt] :  uses seat belt [Sunscreen] : uses sunscreen [Audit-CScore] : 0 [Change in mental status noted] : No change in mental status noted [Language] : denies difficulty with language [Behavior] : denies difficulty with behavior [Learning/Retaining New Information] : denies difficulty learning/retaining new information [Reasoning] : denies difficulty with reasoning [Handling Complex Tasks] : denies difficulty handling complex tasks [Spatial Ability and Orientation] : denies difficulty with spatial ability and orientation [Reports changes in hearing] : Reports no changes in hearing [Reports changes in vision] : Reports no changes in vision [Reports changes in dental health] : Reports no changes in dental health [Guns at Home] : no guns at home [Travel to Developing Areas] : does not  travel to developing areas [TB Exposure] : is not being exposed to tuberculosis [Caregiver Concerns] : does not have caregiver concerns

## 2022-02-16 NOTE — HISTORY OF PRESENT ILLNESS
[FreeTextEntry1] : Presents for preventive visit. [de-identified] : \par Preventive visit: pt is up to date with colonoscopy screening and mammogram screening; she does not smoke cigarettes and does not misuse alcohol; she feels safe at home and has smoke/CO detectors in the house; she wears seatbelts when in vehicles; she sees her GYN for PAP/pelvic exams

## 2022-03-09 ENCOUNTER — APPOINTMENT (OUTPATIENT)
Dept: CARDIOLOGY | Facility: CLINIC | Age: 72
End: 2022-03-09
Payer: MEDICARE

## 2022-03-09 ENCOUNTER — NON-APPOINTMENT (OUTPATIENT)
Age: 72
End: 2022-03-09

## 2022-03-09 VITALS
DIASTOLIC BLOOD PRESSURE: 81 MMHG | HEIGHT: 58.5 IN | HEART RATE: 79 BPM | BODY MASS INDEX: 20.02 KG/M2 | SYSTOLIC BLOOD PRESSURE: 132 MMHG | OXYGEN SATURATION: 99 % | WEIGHT: 98 LBS

## 2022-03-09 PROCEDURE — 99215 OFFICE O/P EST HI 40 MIN: CPT

## 2022-03-09 PROCEDURE — 93000 ELECTROCARDIOGRAM COMPLETE: CPT

## 2022-03-10 ENCOUNTER — TRANSCRIPTION ENCOUNTER (OUTPATIENT)
Age: 72
End: 2022-03-10

## 2022-03-14 ENCOUNTER — TRANSCRIPTION ENCOUNTER (OUTPATIENT)
Age: 72
End: 2022-03-14

## 2022-03-18 ENCOUNTER — APPOINTMENT (OUTPATIENT)
Dept: CARDIOLOGY | Facility: CLINIC | Age: 72
End: 2022-03-18
Payer: MEDICARE

## 2022-03-18 PROCEDURE — 93306 TTE W/DOPPLER COMPLETE: CPT

## 2022-06-15 ENCOUNTER — APPOINTMENT (OUTPATIENT)
Dept: GASTROENTEROLOGY | Facility: CLINIC | Age: 72
End: 2022-06-15
Payer: MEDICARE

## 2022-06-15 VITALS
HEART RATE: 102 BPM | HEIGHT: 58.5 IN | TEMPERATURE: 98 F | BODY MASS INDEX: 19.37 KG/M2 | DIASTOLIC BLOOD PRESSURE: 75 MMHG | WEIGHT: 94.8 LBS | SYSTOLIC BLOOD PRESSURE: 115 MMHG | OXYGEN SATURATION: 97 %

## 2022-06-15 PROCEDURE — 99204 OFFICE O/P NEW MOD 45 MIN: CPT

## 2022-06-15 NOTE — ASSESSMENT
[FreeTextEntry1] : 71F, RA, MGUS, Mitral valve prolapse, osteoporosis, pulmonary arteriovenous malformation s/p coil, raynauds,  patient of GI Dr Bartolo Shelton, here for second opinion on IBS management. \par \par # IBS \par Episodic lower abdominal cramping pain. \par Bowel movements vary between normal to soft. \par EGD 2021 - gastritis, normal esophagus, normal duodenum\par Colonoscopy 2021 - "nonbleeding internal hemorrhoids, severe diverticulosis in the sigmoid colon. there was narrowing of the colon in association with the diverticular opening. there was evidence of diverticular spasm, there was no evidence of diverticular bleeding. mild diverticulosis in the descending colon, in the transverse colon and in the ascending colon. There was no evidence of diverticular bleeding. One 6mm polyp in the cecum removed with a cold snare (path = hyperplastic polyp). Clip was placed."\par \par - Overall improving since symptoms first started last year, has been taking IBGard and eating peppermint patties. Ok to continue \par - Recommend starting daily fiber supplement\par - incorporate fermented foods into diet \par - Stress is a trigger, discussed relaxation techniques, walking, meditation\par - If persistent symptoms, can try anti spasmodic for pain control (patient would like to hold off for now). \par - Discussed dietary changes and trial of dairy free or gluten free if continued symptoms \par - fodmap diet discussed, will start if no response to above \par \par \par # Diverticulosis\par High fiber diet discussed \par Colonoscopy reviewed as above \par \par # Gallbladder polyp \par Getting abd u/s yearly, per paperwork with patient most recenlty polyp was 4mm in size. \par Discussed yearly imaging. \par \par RTC in 4-6 wks \par

## 2022-06-15 NOTE — PHYSICAL EXAM
[General Appearance - Alert] : alert [General Appearance - In No Acute Distress] : in no acute distress [Sclera] : the sclera and conjunctiva were normal [] : no respiratory distress [Exaggerated Use Of Accessory Muscles For Inspiration] : no accessory muscle use [Abdomen Soft] : soft [Abdomen Tenderness] : non-tender [Oriented To Time, Place, And Person] : oriented to person, place, and time [Impaired Insight] : insight and judgment were intact

## 2022-06-15 NOTE — HISTORY OF PRESENT ILLNESS
[de-identified] : 71F, RA, MGUS, Mitral valve prolapse, osteoporosis, pulmonary arteriovenous malformation s/p coil, raynauds,  former patient of GI Dr Bartolo Shelton, here for second opinion on IBS. \par \par The patient states she developed diffuse lower abdominal cramping pain and bloating about one year ago. No clear triggers. Bowel movements are either normal or soft, 1-3x per day. She saw GI Dr Shelton, s/p egd, colon, ct abd/pelvis and told likely IBS. Tried IBGard and peppermint patties w some improvement. Currently feels that symptoms are improving, has cramping abd pain 1-2x per week (previously had it daily). Pain is less intense and less frequent. Bloating has improved. Bowel movements are either normal stool w/ no straining or slightly soft. Denies hematochezia, melena, weight loss. \par \par \par EGD 2021 - gastritis, normal esophagus, normal duodenum\par Colonoscopy 2021 - "nonbleeding internal hemorrhoids, severe diverticulosis in the sigmoid colon. there was narrowing of the colon in association with the diverticular opening. there was evidence of diverticular spasm, there was no evidence of diverticular bleeding. mild diverticulosis in the descending colon, in the transverse colon and in the ascending colon. There was no evidence of diverticular bleeding. One 6mm polyp in the cecum removed with a cold snare (path = hyperplastic polyp). Clip was placed."\par

## 2022-06-27 ENCOUNTER — APPOINTMENT (OUTPATIENT)
Dept: NEUROLOGY | Facility: CLINIC | Age: 72
End: 2022-06-27

## 2022-06-27 VITALS
BODY MASS INDEX: 19.41 KG/M2 | WEIGHT: 95 LBS | HEIGHT: 58.5 IN | DIASTOLIC BLOOD PRESSURE: 75 MMHG | HEART RATE: 89 BPM | SYSTOLIC BLOOD PRESSURE: 120 MMHG

## 2022-06-27 DIAGNOSIS — R25.1 TREMOR, UNSPECIFIED: ICD-10-CM

## 2022-06-27 PROCEDURE — 99205 OFFICE O/P NEW HI 60 MIN: CPT

## 2022-06-27 NOTE — PHYSICAL EXAM
[General Appearance - Alert] : alert [Oriented To Time, Place, And Person] : oriented to person, place, and time [Person] : oriented to person [Place] : oriented to place [Time] : oriented to time [Short Term Intact] : short term memory intact [Fluency] : fluency intact [Current Events] : adequate knowledge of current events [Cranial Nerves Optic (II)] : visual acuity intact bilaterally,  visual fields full to confrontation, pupils equal round and reactive to light [Cranial Nerves Oculomotor (III)] : extraocular motion intact [Cranial Nerves Vestibulocochlear (VIII)] : hearing was intact bilaterally [Cranial Nerves Accessory (XI - Cranial And Spinal)] : head turning and shoulder shrug symmetric [Motor Tone] : muscle tone was normal in all four extremities [Motor Strength] : muscle strength was normal in all four extremities [Involuntary Movements] : no involuntary movements were seen [Sensation Tactile Decrease] : light touch was intact [Sensation Pain / Temperature Decrease] : pain and temperature was intact [Sensation Vibration Decrease] : vibration was intact [Proprioception] : proprioception was intact [Romberg's Sign] : Romberg's sign was negtive [Abnormal Walk] : normal gait [Coordination - Dysmetria Impaired Finger-to-Nose Bilateral] : not present [1+] : Patella left 1+ [FreeTextEntry8] : Able to toe and heel walk without any difficulty.  No en bloc turning

## 2022-06-27 NOTE — DISCUSSION/SUMMARY
[FreeTextEntry1] : 71-year-old woman who is here for initial consultation of hand tremors that seems to be intermittent.  Will obtain MRI of the brain with and without contrast to evaluate for any signs of lesions in the basal ganglia or cerebellum.  We will also have her obtain EEG for low suspicion of seizure activity given the intermittent nature of her tremors.  For her falls there is no high arches or hammertoes to suggest congenital neuropathy however patient has no loss of consciousness with these falls and it has been going on for at least a decade.  With the same history in her sister I wonder if there is some congenital neuropathy that might be contributing to these falls.  Will have her come back for nerve conduction and EMG studies for any signs of demyelination or other signs of large fiber neuropathy as a contributing factor for her symptoms.  Further management pending review of studies.\par \par I spent the time noted on the day of this patient encounter preparing for, providing and documenting the above E/M service and counseling and educate patient on differential, workup, disease course, and treatment/management. Education was provided to the patient during this encounter. All questions and concerns were answered and addressed in detail. The patient verbalized understanding and agreed to plan. Patient was advised to continue to monitor for neurologic symptoms and to notify my office or go to the nearest emergency room if there are any changes. Any orders/referrals and communications were provided as well. \par Side effects of the above medications were discussed in detail including but not limited to applicable black box warning and teratogenicity as appropriate. \par Patient was advised to bring previous records to my office. \par \par \par

## 2022-06-27 NOTE — HISTORY OF PRESENT ILLNESS
[FreeTextEntry1] : 71-year-old woman who is here for initial consultation of tremors and falls.  Patient states that she has at least 3 episodes of tremors with the initial one occurring last year but she does not remember which side or what else was going on during that time.  The second episode occurred on February 19 when she had 5 to 6 seconds of left hand tremors that did not involve any particular activity.  In March patient experienced right hand tremors when she was pouring syrup and it persisted even after a couple of seconds.  Patient had no loss of consciousness or altered sensorium during these episodes.  Patient states that usually her hands tremor whenever she is exercising.  Patient states that she also has a history of falls for at least a few decades where she has no loss of consciousness.  This is manifested by scraped knees and palms by history.  Patient has no abnormal sensation during these episodes and she has seen a cardiologist with Holter monitor which was unremarkable.  Patient also has a family history of her sister who also falls with no diagnoses and her mother has history of tremor as well.  There is no family or personal history of Parkinson's disease or seizure activity.

## 2022-07-20 ENCOUNTER — APPOINTMENT (OUTPATIENT)
Dept: GASTROENTEROLOGY | Facility: CLINIC | Age: 72
End: 2022-07-20

## 2022-07-20 VITALS
SYSTOLIC BLOOD PRESSURE: 122 MMHG | WEIGHT: 93 LBS | DIASTOLIC BLOOD PRESSURE: 80 MMHG | BODY MASS INDEX: 19.52 KG/M2 | OXYGEN SATURATION: 95 % | HEART RATE: 84 BPM | HEIGHT: 58 IN

## 2022-07-20 PROCEDURE — 99214 OFFICE O/P EST MOD 30 MIN: CPT

## 2022-07-20 RX ORDER — SODIUM CHLORIDE FOR INHALATION 0.9 %
0.9 VIAL, NEBULIZER (ML) INHALATION
Qty: 300 | Refills: 0 | Status: ACTIVE | COMMUNITY
Start: 2022-03-31

## 2022-07-20 NOTE — HISTORY OF PRESENT ILLNESS
[de-identified] : Here for follow up visit. \par Overall feels much better since last visit. \par Added benefiber and having more normal stools daily.\par No further sense of incomplete evacuation. \par No changes to dairy free diet. \par Does report improvement in cramping and abd pain with gluten free diet. \par Noticing some cramping pain if she has raw fruits in the mornings w her coffee. \par

## 2022-07-20 NOTE — ASSESSMENT
[FreeTextEntry1] : 71F, RA, MGUS, Mitral valve prolapse, osteoporosis, pulmonary arteriovenous malformation s/p coil, raynauds, patient of GI Dr Bartolo Shelton, here for second opinion on IBS management. \par \par # IBS \par Episodic lower abdominal cramping pain. \par Bowel movements vary between normal to soft. \par EGD 2021 - gastritis, normal esophagus, normal duodenum\par Colonoscopy 2021 - "nonbleeding internal hemorrhoids, severe diverticulosis in the sigmoid colon. there was narrowing of the colon in association with the diverticular opening. there was evidence of diverticular spasm, there was no evidence of diverticular bleeding. mild diverticulosis in the descending colon, in the transverse colon and in the ascending colon. There was no evidence of diverticular bleeding. One 6mm polyp in the cecum removed with a cold snare (path = hyperplastic polyp). Clip was placed."\par \par - Overall has noticed improvement in symptoms since starting benefiber fiber supplement. Stools are now more regular and she no longer has sense of incomplete evacuation. \par - has noticed an improvement on gluten free diet, to continue for now \par - no change w/ dairy free diet, ok to add back \par - ok to continue ibgard and/or peppermint patties \par - incorporate fermented foods into diet \par - some issues w/ raw fruits/vegetables, can try OTC beano when having large quantities \par - Stress is a trigger, discussed relaxation techniques, walking, meditation\par - If persistent symptoms, can try anti spasmodic for pain control (patient would like to hold off for now). \par - fodmap diet discussed, will start if no response to above \par \par \par # Diverticulosis\par High fiber diet discussed \par Colonoscopy reviewed as above \par \par # Gallbladder polyp \par Getting abd u/s yearly, per paperwork with patient most recenlty polyp was 4mm in size. \par Discussed yearly imaging. \par \par \par RTC in 3 mos

## 2022-08-29 ENCOUNTER — APPOINTMENT (OUTPATIENT)
Dept: NEUROLOGY | Facility: CLINIC | Age: 72
End: 2022-08-29

## 2022-08-29 PROCEDURE — 95816 EEG AWAKE AND DROWSY: CPT

## 2022-08-30 PROCEDURE — 95708 EEG WO VID EA 12-26HR UNMNTR: CPT

## 2022-08-31 PROCEDURE — 95708 EEG WO VID EA 12-26HR UNMNTR: CPT

## 2022-09-01 PROCEDURE — 95723 EEG PHY/QHP>60<84 HR W/O VID: CPT

## 2022-09-01 PROCEDURE — 95708 EEG WO VID EA 12-26HR UNMNTR: CPT

## 2022-09-01 PROCEDURE — 95700 EEG CONT REC W/VID EEG TECH: CPT

## 2022-09-06 ENCOUNTER — NON-APPOINTMENT (OUTPATIENT)
Age: 72
End: 2022-09-06

## 2022-09-09 ENCOUNTER — OUTPATIENT (OUTPATIENT)
Dept: OUTPATIENT SERVICES | Facility: HOSPITAL | Age: 72
LOS: 1 days | End: 2022-09-09
Payer: MEDICARE

## 2022-09-09 ENCOUNTER — APPOINTMENT (OUTPATIENT)
Dept: MRI IMAGING | Facility: IMAGING CENTER | Age: 72
End: 2022-09-09

## 2022-09-09 DIAGNOSIS — R25.1 TREMOR, UNSPECIFIED: ICD-10-CM

## 2022-09-09 DIAGNOSIS — Z98.891 HISTORY OF UTERINE SCAR FROM PREVIOUS SURGERY: Chronic | ICD-10-CM

## 2022-09-09 PROCEDURE — 70553 MRI BRAIN STEM W/O & W/DYE: CPT | Mod: 26,MH

## 2022-09-09 PROCEDURE — 70553 MRI BRAIN STEM W/O & W/DYE: CPT

## 2022-09-09 PROCEDURE — A9585: CPT

## 2022-09-13 ENCOUNTER — NON-APPOINTMENT (OUTPATIENT)
Age: 72
End: 2022-09-13

## 2022-09-14 ENCOUNTER — TRANSCRIPTION ENCOUNTER (OUTPATIENT)
Age: 72
End: 2022-09-14

## 2022-09-14 ENCOUNTER — RESULT REVIEW (OUTPATIENT)
Age: 72
End: 2022-09-14

## 2022-09-23 ENCOUNTER — TRANSCRIPTION ENCOUNTER (OUTPATIENT)
Age: 72
End: 2022-09-23

## 2022-10-06 ENCOUNTER — OUTPATIENT (OUTPATIENT)
Dept: OUTPATIENT SERVICES | Facility: HOSPITAL | Age: 72
LOS: 1 days | End: 2022-10-06
Payer: MEDICARE

## 2022-10-06 ENCOUNTER — APPOINTMENT (OUTPATIENT)
Dept: ULTRASOUND IMAGING | Facility: IMAGING CENTER | Age: 72
End: 2022-10-06

## 2022-10-06 DIAGNOSIS — Z98.891 HISTORY OF UTERINE SCAR FROM PREVIOUS SURGERY: Chronic | ICD-10-CM

## 2022-10-06 DIAGNOSIS — K82.4 CHOLESTEROLOSIS OF GALLBLADDER: ICD-10-CM

## 2022-10-06 PROCEDURE — 76705 ECHO EXAM OF ABDOMEN: CPT

## 2022-10-06 PROCEDURE — 76705 ECHO EXAM OF ABDOMEN: CPT | Mod: 26

## 2022-10-11 ENCOUNTER — TRANSCRIPTION ENCOUNTER (OUTPATIENT)
Age: 72
End: 2022-10-11

## 2022-10-19 ENCOUNTER — APPOINTMENT (OUTPATIENT)
Dept: GASTROENTEROLOGY | Facility: CLINIC | Age: 72
End: 2022-10-19

## 2022-10-19 VITALS
SYSTOLIC BLOOD PRESSURE: 110 MMHG | HEART RATE: 80 BPM | HEIGHT: 58.5 IN | BODY MASS INDEX: 19.61 KG/M2 | OXYGEN SATURATION: 98 % | WEIGHT: 96 LBS | DIASTOLIC BLOOD PRESSURE: 75 MMHG | TEMPERATURE: 98 F

## 2022-10-19 PROCEDURE — 99214 OFFICE O/P EST MOD 30 MIN: CPT

## 2022-10-19 NOTE — ASSESSMENT
[FreeTextEntry1] : 71F, RA, MGUS, Mitral valve prolapse, osteoporosis, pulmonary arteriovenous malformation s/p coil, raynauds, previous patient of GI Dr Bartolo Shelton, here for second opinion on IBS management. \par \par # IBS \par Episodic lower abdominal cramping pain. \par Bowel movements vary between normal to soft. \par EGD 2021 - gastritis, normal esophagus, normal duodenum\par Colonoscopy 2021 - "nonbleeding internal hemorrhoids, severe diverticulosis in the sigmoid colon. there was narrowing of the colon in association with the diverticular opening. there was evidence of diverticular spasm, there was no evidence of diverticular bleeding. mild diverticulosis in the descending colon, in the transverse colon and in the ascending colon. There was no evidence of diverticular bleeding. One 6mm polyp in the cecum removed with a cold snare (path = hyperplastic polyp). Clip was placed."\par \par - Overall has noticed improvement in symptoms since starting benefiber fiber supplement. Stools are now more regular and she no longer has sense of incomplete evacuation. \par - has noticed an improvement on gluten free diet, to continue for now \par - continue ibgard and/or peppermint patties \par - incorporate fermented foods into diet \par - some issues w/ raw fruits/vegetables, can try OTC beano when having large quantities \par - Stress is a trigger, discussed relaxation techniques, walking, meditation\par - referral to nutritionist per patinet request,  name and contact info given\par \par \par # Diverticulosis\par High fiber diet discussed \par Colonoscopy reviewed as above \par \par # Gallbladder polyp \par Abd u/s 10/2022 -- stable gallbladder polyps. Two polyps measuring 4mm and 3mm. \par - Repeat in 1 yr. \par \par RTC in 6mos. \par

## 2022-10-19 NOTE — HISTORY OF PRESENT ILLNESS
[FreeTextEntry1] : Here for follow up visit. \par \par Feels well. \par Abdominal pain/spasm is much less frequent now. \par Bowel movements are normal. \par Taking benefiber once a day. \par Periodically takes ibgard. \par \par Denies hematochezia, melena, change in bowel habits, n/v, weight loss.

## 2022-10-31 ENCOUNTER — TRANSCRIPTION ENCOUNTER (OUTPATIENT)
Age: 72
End: 2022-10-31

## 2022-11-01 ENCOUNTER — APPOINTMENT (OUTPATIENT)
Dept: NEUROLOGY | Facility: CLINIC | Age: 72
End: 2022-11-01

## 2022-11-01 PROCEDURE — 95911 NRV CNDJ TEST 9-10 STUDIES: CPT

## 2022-11-01 PROCEDURE — 95886 MUSC TEST DONE W/N TEST COMP: CPT

## 2022-11-01 NOTE — PROCEDURE
[FreeTextEntry1] :   \par                                                                       \par \par Clinical \par Neurophysiology \par Laboratory\par [address]\[place] [address]\par \par EMG/NCV REPORT\par   \par Full Name:	Anjana Leung	YOB: 1950\par   \par Visit Date:	11/1/2022 11:03\par Age:	72 Years 0 Months Old\par Examining Physician:	Samantha\shun Referring Physician:	Samantha\shun Height:	5 feet 4 inch\par   \par Summary\par Sensory nerve conductions\par Left median sensory nerve action potential had normal latency, normal amplitude and decreased conduction velocity. \par \par Left ulnar sensory nerve action potential had normal latency, normal amplitude and normal conduction velocity.\par \par Left radial sensory nerve action potential normal latency, normal amplitude and normal conduction velocity.\par \par Bilateral sural sensory nerve action potential had normal latency, normal amplitude and normal conduction velocity.\par \par \par Motor nerve conductions\par \par Bilateral peroneal nerve compound motor action potential had normal latency, normal amplitude and normal conduction velocity.\par \par Bilateral tibial nerve compound motor action potential had normal latency normal amplitude and normal conduction velocity.\par \par F-wave latency of the bilateral peroneal and tibial nerves were within normal limits.\par \par Needle EMG\par Needle EMG was performed using a disposable concentric needle in the following muscles:\par \par Left tibialis anterior, medial gastrocnemius, long head of the biceps femoris, vastus lateralis, gluteus medius muscles had no spontaneous activity and normal motor units.\par \par Summary: There is an incidental finding of decreased velocity with no correlating elevated latency in the median sensory nerve.  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  Patient has no generalized neuropathy as demonstrated by the normal radial and sural responses.  Patient has no large fiber neuropathy in her lower extremities.  Patient will follow-up with me for further discussion.  She was noted to have Raynaud's syndrome during the exam on her toes and fingertips.\par \par \par \par \par Clinical and radiologic correlation is advised. \par Thank you for the consult,\par Adri Singh MD\par \par Sensory NCS\par   \par Nerve / Sites	Rec. Site	Onset Lat	Peak Lat	NP Amp	PP Amp	Segments	Distance	Velocity\par 		ms	ms	µV	µV		cm	m/s\par L Median - Digit II (Antidromic)\par    Wrist	Dig II	2.03	3.23	92.6	91.3	Wrist - Dig II	10	49\par L Ulnar - Digit V (Antidromic)\par    Wrist	Dig V	2.14	3.02	67.3	94.6	Wrist - Dig V	12	56\par L Radial - Anatomical snuff box (Forearm)\par    Forearm	Wrist	1.41	1.98	46.1	44.1	Forearm - Wrist	7	50\par R Sural - Ankle (Calf)\par    Calf	Ankle	1.82	2.50	42.0	58.1	Calf - Ankle	11	60\par L Sural - Ankle (Calf)\par    Calf	Ankle	1.51	2.14	21.1	47.7	Calf - Ankle	9	60\par                  \par Motor NCS\par   \par Nerve / Sites	Muscle	Latency	Amplitude	Amp %	Duration	Segments	Distance	Lat Diff	Velocity\par 		ms	mV	%	ms		cm	ms	m/s\par L Peroneal - EDB\par    Ankle	EDB	4.06	4.2	100	5.47	Ankle - EDB	9		\par    Fib head	EDB	8.75	4.1	97.6	5.73	Fib head - Ankle	27	4.69	58\par    Pop fossa	EDB	10.68	3.4	80.6	5.94	Pop fossa - Fib head	10	1.93	52\par R Peroneal - EDB\par    Ankle	EDB	4.53	6.1	100	6.04	Ankle - EDB	9		\par    Fib head	EDB	9.32	5.3	87	6.61	Fib head - Ankle	25	4.79	52\par    Pop fossa	EDB	11.25	9.5	156	7.34	Pop fossa - Fib head	10	1.93	52\par L Tibial - AH\par    Ankle	AH	2.92	19.6	100	5.89	Ankle - AH	7		\par    Pop fossa	AH	9.11	14.9	76.1	6.82	Pop fossa - Ankle	32	6.20	52\par R Tibial - AH\par    Ankle	AH	2.66	19.5	100	6.72	Ankle - AH	8		\par    Pop fossa	AH	9.95	16.9	86.5	7.14	Pop fossa - Ankle	33	7.29	45\par               \par F  Wave\par   \par Nerve	F Lat	M Lat	F-M Lat\par 	ms	ms	ms\par L Peroneal - EDB	37.8	4.5	33.2\par L Tibial - AH	39.8	3.9	35.9\par R Tibial - AH	42.0	3.1	38.9\par R Peroneal - EDB	40.5	4.3	36.2\par               \par EMG\par      \par EMG Summary Table	\par 	Spontaneous	MUAP	Recruitment\par Muscle	Nerve	Roots	IA	Fib	PSW	Fasc	H.F.	Amp	Dur.	PPP	Pattern\par L. Tibialis anterior	Deep peroneal (Fibular)	L4-L5	N	None	None	None	None	N	N	N	N\par L. Gastrocnemius (Medial head)	Tibial	S1-S2	N	None	None	None	None	N	N	N	N\par L. Biceps femoris (long head)	Sciatic (tibial division)	L5-S2	N	None	None	None	None	N	N	N	N\par L. Vastus lateralis	Femoral	L2-L4	N	None	None	None	None	N	N	N	N\par L. Gluteus medius	Superior gluteal	L4-S1	N	None	None	None	None	N	N	N	N\par \par  \par

## 2022-11-02 ENCOUNTER — TRANSCRIPTION ENCOUNTER (OUTPATIENT)
Age: 72
End: 2022-11-02

## 2022-11-15 ENCOUNTER — APPOINTMENT (OUTPATIENT)
Dept: DERMATOLOGY | Facility: CLINIC | Age: 72
End: 2022-11-15

## 2022-11-18 ENCOUNTER — APPOINTMENT (OUTPATIENT)
Dept: DERMATOLOGY | Facility: CLINIC | Age: 72
End: 2022-11-18

## 2022-11-18 DIAGNOSIS — L85.3 XEROSIS CUTIS: ICD-10-CM

## 2022-11-18 DIAGNOSIS — D22.9 MELANOCYTIC NEVI, UNSPECIFIED: ICD-10-CM

## 2022-11-18 DIAGNOSIS — L71.9 ROSACEA, UNSPECIFIED: ICD-10-CM

## 2022-11-18 DIAGNOSIS — D18.01 HEMANGIOMA OF SKIN AND SUBCUTANEOUS TISSUE: ICD-10-CM

## 2022-11-18 PROCEDURE — 99214 OFFICE O/P EST MOD 30 MIN: CPT

## 2023-02-06 ENCOUNTER — TRANSCRIPTION ENCOUNTER (OUTPATIENT)
Age: 73
End: 2023-02-06

## 2023-02-13 ENCOUNTER — NON-APPOINTMENT (OUTPATIENT)
Age: 73
End: 2023-02-13

## 2023-02-13 ENCOUNTER — APPOINTMENT (OUTPATIENT)
Dept: INTERNAL MEDICINE | Facility: CLINIC | Age: 73
End: 2023-02-13
Payer: MEDICARE

## 2023-02-13 VITALS
WEIGHT: 97 LBS | SYSTOLIC BLOOD PRESSURE: 132 MMHG | TEMPERATURE: 97.5 F | HEIGHT: 58.5 IN | HEART RATE: 83 BPM | BODY MASS INDEX: 19.82 KG/M2 | DIASTOLIC BLOOD PRESSURE: 70 MMHG | OXYGEN SATURATION: 97 %

## 2023-02-13 DIAGNOSIS — L65.9 NONSCARRING HAIR LOSS, UNSPECIFIED: ICD-10-CM

## 2023-02-13 DIAGNOSIS — Z01.818 ENCOUNTER FOR OTHER PREPROCEDURAL EXAMINATION: ICD-10-CM

## 2023-02-13 DIAGNOSIS — E87.0 HYPEROSMOLALITY AND HYPERNATREMIA: ICD-10-CM

## 2023-02-13 DIAGNOSIS — R29.6 REPEATED FALLS: ICD-10-CM

## 2023-02-13 DIAGNOSIS — R07.89 OTHER CHEST PAIN: ICD-10-CM

## 2023-02-13 PROCEDURE — 99214 OFFICE O/P EST MOD 30 MIN: CPT

## 2023-02-13 NOTE — HISTORY OF PRESENT ILLNESS
[No Pertinent Cardiac History] : no history of aortic stenosis, atrial fibrillation, coronary artery disease, recent myocardial infarction, or implantable device/pacemaker [Aortic Stenosis] : no aortic stenosis [Atrial Fibrillation] : no atrial fibrillation [Coronary Artery Disease] : no coronary artery disease [Recent Myocardial Infarction] : no recent myocardial infarction [Implantable Device/Pacemaker] : no implantable device/pacemaker [No Pertinent Pulmonary History] : no history of asthma, COPD, sleep apnea, or smoking [Asthma] : no asthma [COPD] : no COPD [Sleep Apnea] : no sleep apnea [Smoker] : not a smoker [No Adverse Anesthesia Reaction] : no adverse anesthesia reaction in self or family member [Family Member] : no family member with adverse anesthesia reaction/sudden death [Self] : no previous adverse anesthesia reaction [Chronic Anticoagulation] : no chronic anticoagulation [Chronic Kidney Disease] : no chronic kidney disease [Diabetes] : no diabetes [(Patient denies any chest pain, claudication, dyspnea on exertion, orthopnea, palpitations or syncope)] : Patient denies any chest pain, claudication, dyspnea on exertion, orthopnea, palpitations or syncope [Excellent (>10 METs)] : Excellent (>10 METs) [FreeTextEntry1] : cataract surgery [FreeTextEntry2] : 2/17/23 and 2/23/23 [FreeTextEntry3] : Dr. Renaldo Luna

## 2023-02-13 NOTE — ASSESSMENT
[High Risk Surgery - Intraperitoneal, Intrathoracic or Supringuinal Vascular Procedures] : High Risk Surgery - Intraperitoneal, Intrathoracic or Supringuinal Vascular Procedures - No (0) [Ischemic Heart Disease] : Ischemic Heart Disease - No (0) [Congestive Heart Failure] : Congestive Heart Failure - No (0) [Prior Cerebrovascular Accident or TIA] : Prior Cerebrovascular Accident or TIA - No (0) [Creatinine >= 2mg/dL (1 Point)] : Creatinine >= 2mg/dL - No (0) [Insulin-dependent Diabetic (1 Point)] : Insulin-dependent Diabetic - No (0) [0] : 0 , RCRI Class: I, Risk of Post-Op Cardiac Complications: 3.9%, 95% CI for Risk Estimate: 2.8% - 5.4% [Patient Optimized for Surgery] : Patient optimized for surgery [No Further Testing Recommended] : no further testing recommended [FreeTextEntry4] : \par Labs, EKG reviewed and stable. There is no medical contraindication to proceed with the planned surgery at this time.

## 2023-02-13 NOTE — REVIEW OF SYSTEMS
Health Maintenance Summary     Topic Due On Due Status Completed On    Immunization - Td/Tdap May 15, 2023 Not Due May 15, 2013    Immunization-Influenza Sep 1, 2016 Overdue Oct 22, 2012          Patient is due for topics as listed above, he wishes to decline at this time .       [Negative] : Heme/Lymph

## 2023-02-14 LAB
ALBUMIN SERPL ELPH-MCNC: 4.5 G/DL
ALP BLD-CCNC: 45 U/L
ALT SERPL-CCNC: 16 U/L
ANION GAP SERPL CALC-SCNC: 11 MMOL/L
APTT BLD: 31.7 SEC
AST SERPL-CCNC: 19 U/L
BASOPHILS # BLD AUTO: 0.03 K/UL
BASOPHILS NFR BLD AUTO: 0.4 %
BILIRUB SERPL-MCNC: 0.4 MG/DL
BUN SERPL-MCNC: 15 MG/DL
CALCIUM SERPL-MCNC: 10.3 MG/DL
CHLORIDE SERPL-SCNC: 102 MMOL/L
CO2 SERPL-SCNC: 27 MMOL/L
CREAT SERPL-MCNC: 0.64 MG/DL
EGFR: 93 ML/MIN/1.73M2
EOSINOPHIL # BLD AUTO: 0.08 K/UL
EOSINOPHIL NFR BLD AUTO: 1.1 %
GLUCOSE SERPL-MCNC: 86 MG/DL
HCT VFR BLD CALC: 41.9 %
HGB BLD-MCNC: 13.4 G/DL
IMM GRANULOCYTES NFR BLD AUTO: 0.1 %
INR PPP: 0.93 RATIO
LYMPHOCYTES # BLD AUTO: 1.55 K/UL
LYMPHOCYTES NFR BLD AUTO: 21.8 %
MAN DIFF?: NORMAL
MCHC RBC-ENTMCNC: 30.6 PG
MCHC RBC-ENTMCNC: 32 GM/DL
MCV RBC AUTO: 95.7 FL
MONOCYTES # BLD AUTO: 0.32 K/UL
MONOCYTES NFR BLD AUTO: 4.5 %
NEUTROPHILS # BLD AUTO: 5.11 K/UL
NEUTROPHILS NFR BLD AUTO: 72.1 %
PLATELET # BLD AUTO: 230 K/UL
POTASSIUM SERPL-SCNC: 4.5 MMOL/L
PROT SERPL-MCNC: 6.8 G/DL
PT BLD: 11 SEC
RBC # BLD: 4.38 M/UL
RBC # FLD: 13.4 %
SODIUM SERPL-SCNC: 141 MMOL/L
WBC # FLD AUTO: 7.1 K/UL

## 2023-02-27 ENCOUNTER — APPOINTMENT (OUTPATIENT)
Dept: NEUROLOGY | Facility: CLINIC | Age: 73
End: 2023-02-27
Payer: MEDICARE

## 2023-02-27 VITALS
BODY MASS INDEX: 19.61 KG/M2 | SYSTOLIC BLOOD PRESSURE: 128 MMHG | DIASTOLIC BLOOD PRESSURE: 72 MMHG | HEIGHT: 58.5 IN | WEIGHT: 96 LBS | HEART RATE: 91 BPM

## 2023-02-27 DIAGNOSIS — H26.9 UNSPECIFIED CATARACT: ICD-10-CM

## 2023-02-27 DIAGNOSIS — G43.909 MIGRAINE, UNSPECIFIED, NOT INTRACTABLE, W/OUT STATUS MIGRAINOSUS: ICD-10-CM

## 2023-02-27 PROCEDURE — 99215 OFFICE O/P EST HI 40 MIN: CPT

## 2023-02-27 RX ORDER — PREDNISOLONE SODIUM PHOSPHATE 10 MG/ML
1 SOLUTION/ DROPS OPHTHALMIC
Refills: 0 | Status: ACTIVE | COMMUNITY

## 2023-02-27 RX ORDER — VALACYCLOVIR 1 G/1
1 TABLET, FILM COATED ORAL
Qty: 30 | Refills: 0 | Status: DISCONTINUED | COMMUNITY
Start: 2022-05-30 | End: 2023-02-27

## 2023-02-27 NOTE — HISTORY OF PRESENT ILLNESS
[FreeTextEntry1] : 72-year-old woman who is here for initial consultation of tremors and falls.  Patient states that she has at least 3 episodes of tremors with the initial one occurring last year but she does not remember which side or what else was going on during that time.  The second episode occurred on February 19 when she had 5 to 6 seconds of left hand tremors that did not involve any particular activity.  In March patient experienced right hand tremors when she was pouring syrup and it persisted even after a couple of seconds.  Patient had no loss of consciousness or altered sensorium during these episodes.  Patient states that usually her hands tremor whenever she is exercising.  Patient states that she also has a history of falls for at least a few decades where she has no loss of consciousness.  This is manifested by scraped knees and palms by history.  Patient has no abnormal sensation during these episodes and she has seen a cardiologist with Holter monitor which was unremarkable.  Patient also has a family history of her sister who also falls with no diagnoses and her mother has history of tremor as well.  There is no family or personal history of Parkinson's disease or seizure activity.\par \par Interval history February 27, 2023: Patient's MRI of the brain was unremarkable and the nerve conduction studies show some slowing in the median nerves but no generalized large fiber neuropathy.  Patient states that her tremors have resolved.  Better since EEG was normal as well.  Since her last visit patient had cataract surgery in the right eye and she has some blurriness which she will follow-up with ophthalmology.  Patient states that she had some pain in her eye after the surgery along with pain in the upper jaw that may have been due to postsurgical changes versus migraine exacerbation.  Patient was reassured and she will continue to monitor.

## 2023-02-27 NOTE — DISCUSSION/SUMMARY
[FreeTextEntry1] : 72-year-old woman who is here for follow-up of her hand tremors that have resolved.  Patient's MRI of the brain with and without contrast shows no abnormality especially in the basal ganglia or the cerebellum.  Patient had normal EEG.  Patient had nerve conduction and EMG studies which was negative for demyelinating neuropathy or large fiber neuropathy.  Patient will continue to monitor and will follow-up with me in 3 months.\par \par I spent the time noted on the day of this patient encounter preparing for, providing and documenting the above E/M service and counseling and educate patient on differential, workup, disease course, and treatment/management. Education was provided to the patient during this encounter. All questions and concerns were answered and addressed in detail. The patient verbalized understanding and agreed to plan. Patient was advised to continue to monitor for neurologic symptoms and to notify my office or go to the nearest emergency room if there are any changes. Any orders/referrals and communications were provided as well. \par Side effects of the above medications were discussed in detail including but not limited to applicable black box warning and teratogenicity as appropriate. \par Patient was advised to bring previous records to my office. \par \par \par

## 2023-03-15 ENCOUNTER — APPOINTMENT (OUTPATIENT)
Dept: INTERNAL MEDICINE | Facility: CLINIC | Age: 73
End: 2023-03-15
Payer: MEDICARE

## 2023-03-15 VITALS
SYSTOLIC BLOOD PRESSURE: 120 MMHG | TEMPERATURE: 97.6 F | BODY MASS INDEX: 19.61 KG/M2 | HEART RATE: 96 BPM | WEIGHT: 96 LBS | DIASTOLIC BLOOD PRESSURE: 64 MMHG | HEIGHT: 58.5 IN | OXYGEN SATURATION: 95 %

## 2023-03-15 DIAGNOSIS — M25.512 PAIN IN LEFT SHOULDER: ICD-10-CM

## 2023-03-15 DIAGNOSIS — R22.0 LOCALIZED SWELLING, MASS AND LUMP, HEAD: ICD-10-CM

## 2023-03-15 PROCEDURE — G0439: CPT

## 2023-03-16 NOTE — HEALTH RISK ASSESSMENT
[Good] : ~his/her~  mood as  good [No] : No [1 or 2 (0 pts)] : 1 or 2 (0 points) [Never (0 pts)] : Never (0 points) [No falls in past year] : Patient reported no falls in the past year [Audit-CScore] : 0 [Change in mental status noted] : No change in mental status noted [Language] : denies difficulty with language [Behavior] : denies difficulty with behavior [Learning/Retaining New Information] : denies difficulty learning/retaining new information [Handling Complex Tasks] : denies difficulty handling complex tasks [Reasoning] : denies difficulty with reasoning [Spatial Ability and Orientation] : denies difficulty with spatial ability and orientation [None] : None [With Family] : lives with family [Fully functional (bathing, dressing, toileting, transferring, walking, feeding)] : Fully functional (bathing, dressing, toileting, transferring, walking, feeding) [Fully functional (using the telephone, shopping, preparing meals, housekeeping, doing laundry, using] : Fully functional and needs no help or supervision to perform IADLs (using the telephone, shopping, preparing meals, housekeeping, doing laundry, using transportation, managing medications and managing finances) [Reports changes in hearing] : Reports no changes in hearing [Reports changes in vision] : Reports no changes in vision [Reports normal functional visual acuity (ie: able to read med bottle)] : Reports normal functional visual acuity [Reports changes in dental health] : Reports no changes in dental health [Smoke Detector] : smoke detector [Carbon Monoxide Detector] : carbon monoxide detector [Guns at Home] : no guns at home [Safety elements used in home] : safety elements used in home [Seat Belt] :  uses seat belt [Sunscreen] : uses sunscreen [Travel to Developing Areas] : does not  travel to developing areas [TB Exposure] : is not being exposed to tuberculosis [Caregiver Concerns] : does not have caregiver concerns

## 2023-03-16 NOTE — PHYSICAL EXAM
[Well Nourished] : well nourished [Well Developed] : well developed [Well-Appearing] : well-appearing [Normal Sclera/Conjunctiva] : normal sclera/conjunctiva [No Lymphadenopathy] : no lymphadenopathy [Supple] : supple [No Respiratory Distress] : no respiratory distress  [No Accessory Muscle Use] : no accessory muscle use [Clear to Auscultation] : lungs were clear to auscultation bilaterally [Normal Rate] : normal rate  [Regular Rhythm] : with a regular rhythm [Normal S1, S2] : normal S1 and S2 [No Murmur] : no murmur heard [No Carotid Bruits] : no carotid bruits [No Abdominal Bruit] : a ~M bruit was not heard ~T in the abdomen [No Varicosities] : no varicosities [Pedal Pulses Present] : the pedal pulses are present [No Edema] : there was no peripheral edema [No Palpable Aorta] : no palpable aorta [No Extremity Clubbing/Cyanosis] : no extremity clubbing/cyanosis [Normal Appearance] : normal in appearance [No Nipple Discharge] : no nipple discharge [No Axillary Lymphadenopathy] : no axillary lymphadenopathy [Soft] : abdomen soft [Non Tender] : non-tender [Non-distended] : non-distended [No Masses] : no abdominal mass palpated [No HSM] : no HSM [Normal Bowel Sounds] : normal bowel sounds [No CVA Tenderness] : no CVA  tenderness [No Spinal Tenderness] : no spinal tenderness [No Joint Swelling] : no joint swelling [Grossly Normal Strength/Tone] : grossly normal strength/tone [No Rash] : no rash [Coordination Grossly Intact] : coordination grossly intact [No Focal Deficits] : no focal deficits [Normal Gait] : normal gait [Normal Affect] : the affect was normal [Normal Insight/Judgement] : insight and judgment were intact [de-identified] : small palpable mass behind R ear, non-tender [de-identified] : strength 2/5 with internal rotation of the L shoulder

## 2023-03-16 NOTE — HISTORY OF PRESENT ILLNESS
[FreeTextEntry1] : Presents for preventive visit. [de-identified] : \par Preventive visit: pt is up to date with colonoscopy screening and mammogram screening; she does not smoke cigarettes and does not misuse alcohol; she feels safe at home and has smoke/CO detectors in the house; she wears seatbelts when in vehicles; she sees her GYN for PAP/pelvic exams

## 2023-03-16 NOTE — PAST MEDICAL HISTORY
Patient tolerated the procedure very well under propofol sedation. [Postmenopausal] : postmenopausal

## 2023-03-16 NOTE — ASSESSMENT
[FreeTextEntry1] : \par Preventive visit: flu vaccine up to date; she is up to date with colonoscopy screening and mammogram screening; she does not smoke cigarettes and does not misuse alcohol; she feels safe at home and has smoke/CO detectors in the house; she wears seatbelts when in vehicles; she sees her GYN for PAP/pelvic exams\par \par L shoulder pain: limited range of motion today, concerning for rotator cuff injury; check MRI and send for PT and/or Ortho pending MRI results\par \par Mass behind ear: suspect cartilage, check with U/S to r/o cystic vs. solid mass

## 2023-03-17 ENCOUNTER — TRANSCRIPTION ENCOUNTER (OUTPATIENT)
Age: 73
End: 2023-03-17

## 2023-03-17 LAB
CHOLEST SERPL-MCNC: 230 MG/DL
HDLC SERPL-MCNC: 79 MG/DL
LDLC SERPL CALC-MCNC: 136 MG/DL
NONHDLC SERPL-MCNC: 150 MG/DL
TRIGL SERPL-MCNC: 74 MG/DL

## 2023-03-23 ENCOUNTER — TRANSCRIPTION ENCOUNTER (OUTPATIENT)
Age: 73
End: 2023-03-23

## 2023-03-24 ENCOUNTER — TRANSCRIPTION ENCOUNTER (OUTPATIENT)
Age: 73
End: 2023-03-24

## 2023-04-04 ENCOUNTER — APPOINTMENT (OUTPATIENT)
Dept: ULTRASOUND IMAGING | Facility: CLINIC | Age: 73
End: 2023-04-04
Payer: MEDICARE

## 2023-04-04 ENCOUNTER — OUTPATIENT (OUTPATIENT)
Dept: OUTPATIENT SERVICES | Facility: HOSPITAL | Age: 73
LOS: 1 days | End: 2023-04-04
Payer: MEDICARE

## 2023-04-04 ENCOUNTER — APPOINTMENT (OUTPATIENT)
Dept: MRI IMAGING | Facility: CLINIC | Age: 73
End: 2023-04-04
Payer: MEDICARE

## 2023-04-04 DIAGNOSIS — R22.0 LOCALIZED SWELLING, MASS AND LUMP, HEAD: ICD-10-CM

## 2023-04-04 DIAGNOSIS — M25.512 PAIN IN LEFT SHOULDER: ICD-10-CM

## 2023-04-04 DIAGNOSIS — Z98.891 HISTORY OF UTERINE SCAR FROM PREVIOUS SURGERY: Chronic | ICD-10-CM

## 2023-04-04 PROCEDURE — 76536 US EXAM OF HEAD AND NECK: CPT

## 2023-04-04 PROCEDURE — 73221 MRI JOINT UPR EXTREM W/O DYE: CPT

## 2023-04-04 PROCEDURE — 73221 MRI JOINT UPR EXTREM W/O DYE: CPT | Mod: 26,LT,MH

## 2023-04-04 PROCEDURE — 76536 US EXAM OF HEAD AND NECK: CPT | Mod: 26

## 2023-04-05 ENCOUNTER — TRANSCRIPTION ENCOUNTER (OUTPATIENT)
Age: 73
End: 2023-04-05

## 2023-04-11 ENCOUNTER — TRANSCRIPTION ENCOUNTER (OUTPATIENT)
Age: 73
End: 2023-04-11

## 2023-04-12 ENCOUNTER — TRANSCRIPTION ENCOUNTER (OUTPATIENT)
Age: 73
End: 2023-04-12

## 2023-05-17 ENCOUNTER — TRANSCRIPTION ENCOUNTER (OUTPATIENT)
Age: 73
End: 2023-05-17

## 2023-06-14 ENCOUNTER — APPOINTMENT (OUTPATIENT)
Dept: GASTROENTEROLOGY | Facility: CLINIC | Age: 73
End: 2023-06-14
Payer: MEDICARE

## 2023-06-14 VITALS
OXYGEN SATURATION: 97 % | WEIGHT: 95 LBS | HEART RATE: 100 BPM | HEIGHT: 58.5 IN | DIASTOLIC BLOOD PRESSURE: 68 MMHG | BODY MASS INDEX: 19.41 KG/M2 | TEMPERATURE: 97.3 F | SYSTOLIC BLOOD PRESSURE: 119 MMHG

## 2023-06-14 DIAGNOSIS — K82.4 CHOLESTEROLOSIS OF GALLBLADDER: ICD-10-CM

## 2023-06-14 DIAGNOSIS — K58.9 IRRITABLE BOWEL SYNDROME W/OUT DIARRHEA: ICD-10-CM

## 2023-06-14 DIAGNOSIS — K57.30 DIVERTICULOSIS OF LARGE INTESTINE W/OUT PERFORATION OR ABSCESS W/OUT BLEEDING: ICD-10-CM

## 2023-06-14 PROCEDURE — 99213 OFFICE O/P EST LOW 20 MIN: CPT

## 2023-06-14 NOTE — HISTORY OF PRESENT ILLNESS
[FreeTextEntry1] : Here for f/u visit. \par Last seen in office in October 2022. \par \par Overall had been doing well since her last visit. \par Recently has had more frequent "spasms" in lower abdomen \par Generally resolves after a few minutes \par Bowel movements are normal \par Denies n/v, fevers, chills, weight loss, hematochezia, melena \par \par

## 2023-06-14 NOTE — PHYSICAL EXAM
[Abdomen Tenderness] : non-tender [Abdomen Soft] : soft [Normal] : oriented to person, place, and time

## 2023-06-14 NOTE — ASSESSMENT
[FreeTextEntry1] : 71F, RA, MGUS, Mitral valve prolapse, osteoporosis, pulmonary arteriovenous malformation s/p coil, raynauds, previous patient of GI Dr Bartolo Shelton, following for second opinion on IBS management. \par \par # IBS \par EGD 2021 - gastritis, normal esophagus, normal duodenum\par Colonoscopy 2021 - "nonbleeding internal hemorrhoids, severe diverticulosis in the sigmoid colon. there was narrowing of the colon in association with the diverticular opening. there was evidence of diverticular spasm, there was no evidence of diverticular bleeding. mild diverticulosis in the descending colon, in the transverse colon and in the ascending colon. There was no evidence of diverticular bleeding. One 6mm polyp in the cecum removed with a cold snare (path = hyperplastic polyp). Clip was placed."\par \par - Overall has noticed improvement in symptoms since starting benefiber fiber supplement. Stools are now more regular and she no longer has sense of incomplete evacuation. \par - has noticed an improvement on gluten free diet, to continue for now \par - incorporate fermented foods into diet \par - Stress is a trigger, discussed relaxation techniques, walking, meditation\par - referral to nutritionist given at prior visit \par - abd spasms slightly worse over the last month, IBGard usually helps. If no improvement, will start antispasmodic. \par \par \par # Diverticulosis\par High fiber diet discussed \par Colonoscopy reviewed as above \par \par # Gallbladder polyp \par Abd u/s 10/2022 -- stable gallbladder polyps. Two polyps measuring 4mm and 3mm. \par - Repeat in Oct 2023\par \par RTC in 3 mos \par

## 2023-06-16 ENCOUNTER — APPOINTMENT (OUTPATIENT)
Dept: CARDIOLOGY | Facility: CLINIC | Age: 73
End: 2023-06-16
Payer: MEDICARE

## 2023-06-16 ENCOUNTER — NON-APPOINTMENT (OUTPATIENT)
Age: 73
End: 2023-06-16

## 2023-06-16 VITALS
BODY MASS INDEX: 19.61 KG/M2 | OXYGEN SATURATION: 100 % | WEIGHT: 96 LBS | SYSTOLIC BLOOD PRESSURE: 118 MMHG | HEIGHT: 58.5 IN | DIASTOLIC BLOOD PRESSURE: 76 MMHG | HEART RATE: 85 BPM

## 2023-06-16 PROCEDURE — 93000 ELECTROCARDIOGRAM COMPLETE: CPT

## 2023-06-16 PROCEDURE — 99214 OFFICE O/P EST MOD 30 MIN: CPT

## 2023-06-26 ENCOUNTER — TRANSCRIPTION ENCOUNTER (OUTPATIENT)
Age: 73
End: 2023-06-26

## 2023-06-29 ENCOUNTER — OUTPATIENT (OUTPATIENT)
Dept: OUTPATIENT SERVICES | Facility: HOSPITAL | Age: 73
LOS: 1 days | End: 2023-06-29
Payer: MEDICARE

## 2023-06-29 ENCOUNTER — APPOINTMENT (OUTPATIENT)
Dept: ULTRASOUND IMAGING | Facility: CLINIC | Age: 73
End: 2023-06-29
Payer: MEDICARE

## 2023-06-29 DIAGNOSIS — Z98.891 HISTORY OF UTERINE SCAR FROM PREVIOUS SURGERY: Chronic | ICD-10-CM

## 2023-06-29 DIAGNOSIS — K82.4 CHOLESTEROLOSIS OF GALLBLADDER: ICD-10-CM

## 2023-06-29 PROCEDURE — 76700 US EXAM ABDOM COMPLETE: CPT

## 2023-06-29 PROCEDURE — 76700 US EXAM ABDOM COMPLETE: CPT | Mod: 26

## 2023-07-14 ENCOUNTER — TRANSCRIPTION ENCOUNTER (OUTPATIENT)
Age: 73
End: 2023-07-14

## 2023-08-21 NOTE — PRE-ANESTHESIA EVALUATION ADULT - NSANTHAGERD_ENT_A_CORE
Yes
Yes
Methotrexate Pregnancy And Lactation Text: This medication is Pregnancy Category X and is known to cause fetal harm. This medication is excreted in breast milk.

## 2023-09-05 ENCOUNTER — APPOINTMENT (OUTPATIENT)
Dept: CARDIOLOGY | Facility: CLINIC | Age: 73
End: 2023-09-05
Payer: MEDICARE

## 2023-09-05 ENCOUNTER — NON-APPOINTMENT (OUTPATIENT)
Age: 73
End: 2023-09-05

## 2023-09-05 VITALS
DIASTOLIC BLOOD PRESSURE: 70 MMHG | BODY MASS INDEX: 22.21 KG/M2 | WEIGHT: 96 LBS | TEMPERATURE: 97.3 F | SYSTOLIC BLOOD PRESSURE: 105 MMHG | HEIGHT: 55 IN | OXYGEN SATURATION: 98 % | HEART RATE: 97 BPM

## 2023-09-05 DIAGNOSIS — Z87.898 PERSONAL HISTORY OF OTHER SPECIFIED CONDITIONS: ICD-10-CM

## 2023-09-05 DIAGNOSIS — Z01.810 ENCOUNTER FOR PREPROCEDURAL CARDIOVASCULAR EXAMINATION: ICD-10-CM

## 2023-09-05 DIAGNOSIS — Z13.6 ENCOUNTER FOR SCREENING FOR CARDIOVASCULAR DISORDERS: ICD-10-CM

## 2023-09-05 LAB
ALBUMIN SERPL ELPH-MCNC: 4.5 G/DL
ALP BLD-CCNC: 45 U/L
ALT SERPL-CCNC: 14 U/L
ANION GAP SERPL CALC-SCNC: 11 MMOL/L
AST SERPL-CCNC: 17 U/L
BILIRUB SERPL-MCNC: 0.4 MG/DL
BUN SERPL-MCNC: 11 MG/DL
CALCIUM SERPL-MCNC: 9.7 MG/DL
CHLORIDE SERPL-SCNC: 106 MMOL/L
CHOLEST SERPL-MCNC: 216 MG/DL
CO2 SERPL-SCNC: 27 MMOL/L
CREAT SERPL-MCNC: 0.61 MG/DL
EGFR: 94 ML/MIN/1.73M2
ESTIMATED AVERAGE GLUCOSE: 117 MG/DL
GLUCOSE SERPL-MCNC: 79 MG/DL
HBA1C MFR BLD HPLC: 5.7 %
HCT VFR BLD CALC: 43.2 %
HDLC SERPL-MCNC: 76 MG/DL
HGB BLD-MCNC: 13.6 G/DL
LDLC SERPL CALC-MCNC: 121 MG/DL
MCHC RBC-ENTMCNC: 30.2 PG
MCHC RBC-ENTMCNC: 31.5 GM/DL
MCV RBC AUTO: 96 FL
NONHDLC SERPL-MCNC: 140 MG/DL
PLATELET # BLD AUTO: 200 K/UL
POTASSIUM SERPL-SCNC: 5.1 MMOL/L
PROT SERPL-MCNC: 6.6 G/DL
RBC # BLD: 4.5 M/UL
RBC # FLD: 12.8 %
SODIUM SERPL-SCNC: 144 MMOL/L
TRIGL SERPL-MCNC: 111 MG/DL
TSH SERPL-ACNC: 0.31 UIU/ML
WBC # FLD AUTO: 5.32 K/UL

## 2023-09-05 PROCEDURE — 93000 ELECTROCARDIOGRAM COMPLETE: CPT

## 2023-09-05 PROCEDURE — 99214 OFFICE O/P EST MOD 30 MIN: CPT

## 2023-09-05 PROCEDURE — 36415 COLL VENOUS BLD VENIPUNCTURE: CPT

## 2023-12-19 ENCOUNTER — APPOINTMENT (OUTPATIENT)
Dept: DERMATOLOGY | Facility: CLINIC | Age: 73
End: 2023-12-19
Payer: MEDICARE

## 2023-12-19 DIAGNOSIS — L65.9 NONSCARRING HAIR LOSS, UNSPECIFIED: ICD-10-CM

## 2023-12-19 DIAGNOSIS — L81.4 OTHER MELANIN HYPERPIGMENTATION: ICD-10-CM

## 2023-12-19 DIAGNOSIS — L82.1 OTHER SEBORRHEIC KERATOSIS: ICD-10-CM

## 2023-12-19 DIAGNOSIS — Z12.83 ENCOUNTER FOR SCREENING FOR MALIGNANT NEOPLASM OF SKIN: ICD-10-CM

## 2023-12-19 PROCEDURE — 99214 OFFICE O/P EST MOD 30 MIN: CPT

## 2023-12-19 NOTE — HISTORY OF PRESENT ILLNESS
[FreeTextEntry1] : evaluation of skin lesions, hair shedding  [de-identified] : here for evaluation of skin lesions - has redness inside nose that she would like evaluated also with hair shedding that began after surgery and general anesthesia  has brown spots and tag on chin that are not new but would like evaluated

## 2023-12-19 NOTE — ASSESSMENT
[FreeTextEntry1] : Telangiectasia, left nostril - no evidence of skin growth - rtc if any growth or symptoms (bleeding etc)  Seborrheic keratoses lentigines  - benign, reassurance, no intervention needed unless irritated - discussed retinoids used in photoaging - pt inquiring about otc option (adapalene)  Hair shedding  Favor telogen effluvium given hx  - discussed typical time course of hair shedding and regrowth - given known trigger, no additional workup at this time  - could trial topical minoxidil - Recommend trial of OTC minoxidil 5% solution daily. Discussed proper application to affected areas of scalp, avoidance of face due to risk of hypertrichosis, need for at least 3-6 months of consistent use to see effect, possible shedding with initial use and that results are dependent on continued use of product.

## 2023-12-19 NOTE — PHYSICAL EXAM
[FreeTextEntry3] : pedunculated papule on chin telangiectasias on left nostril  flat to stuck on brown/tan macules and papules on face

## 2024-01-16 ENCOUNTER — APPOINTMENT (OUTPATIENT)
Dept: INTERNAL MEDICINE | Facility: CLINIC | Age: 74
End: 2024-01-16
Payer: MEDICARE

## 2024-01-16 VITALS
HEIGHT: 58.5 IN | SYSTOLIC BLOOD PRESSURE: 109 MMHG | HEART RATE: 94 BPM | BODY MASS INDEX: 19.41 KG/M2 | WEIGHT: 95 LBS | DIASTOLIC BLOOD PRESSURE: 65 MMHG | OXYGEN SATURATION: 96 %

## 2024-01-16 DIAGNOSIS — Z12.39 ENCOUNTER FOR OTHER SCREENING FOR MALIGNANT NEOPLASM OF BREAST: ICD-10-CM

## 2024-01-16 PROCEDURE — 99214 OFFICE O/P EST MOD 30 MIN: CPT

## 2024-01-16 RX ORDER — ZOLEDRONIC ACID 5 MG/100ML
5 INJECTION, SOLUTION INTRAVENOUS
Refills: 0 | Status: ACTIVE | COMMUNITY

## 2024-01-16 NOTE — HISTORY OF PRESENT ILLNESS
[FreeTextEntry8] : cc-- establish care  72 yo F with bronchiectasis, hx MAC, RA, MVP, osteoporosis, pulm AVM, HLD, migraines presents to establish care. Annual due in March 2024.   shoulder surgery Sept 2023. Still doing PT, not exercising. Not able to walk/exercise too much due to bronchiectasis and hx MAC. + coughing and frequent mucus plugs. Sees outside pulm.  Concerns for pre-diabetes and HLD. She does eat in moderation.   She does need mammo, was due in Dec. Has dense breasts.

## 2024-01-18 ENCOUNTER — TRANSCRIPTION ENCOUNTER (OUTPATIENT)
Age: 74
End: 2024-01-18

## 2024-01-18 LAB
ALBUMIN SERPL ELPH-MCNC: 4.5 G/DL
ALP BLD-CCNC: 49 U/L
ALT SERPL-CCNC: 11 U/L
ANION GAP SERPL CALC-SCNC: 10 MMOL/L
AST SERPL-CCNC: 18 U/L
BILIRUB SERPL-MCNC: 0.4 MG/DL
BUN SERPL-MCNC: 11 MG/DL
CALCIUM SERPL-MCNC: 10 MG/DL
CHLORIDE SERPL-SCNC: 106 MMOL/L
CHOLEST SERPL-MCNC: 205 MG/DL
CO2 SERPL-SCNC: 27 MMOL/L
CREAT SERPL-MCNC: 0.57 MG/DL
EGFR: 95 ML/MIN/1.73M2
GLUCOSE SERPL-MCNC: 95 MG/DL
HDLC SERPL-MCNC: 70 MG/DL
LDLC SERPL CALC-MCNC: 121 MG/DL
NONHDLC SERPL-MCNC: 134 MG/DL
POTASSIUM SERPL-SCNC: 4.4 MMOL/L
PROT SERPL-MCNC: 6.7 G/DL
SODIUM SERPL-SCNC: 143 MMOL/L
TRIGL SERPL-MCNC: 76 MG/DL

## 2024-01-19 ENCOUNTER — TRANSCRIPTION ENCOUNTER (OUTPATIENT)
Age: 74
End: 2024-01-19

## 2024-01-26 ENCOUNTER — APPOINTMENT (OUTPATIENT)
Dept: MAMMOGRAPHY | Facility: CLINIC | Age: 74
End: 2024-01-26
Payer: MEDICARE

## 2024-01-26 ENCOUNTER — OUTPATIENT (OUTPATIENT)
Dept: OUTPATIENT SERVICES | Facility: HOSPITAL | Age: 74
LOS: 1 days | End: 2024-01-26
Payer: MEDICARE

## 2024-01-26 ENCOUNTER — RESULT REVIEW (OUTPATIENT)
Age: 74
End: 2024-01-26

## 2024-01-26 DIAGNOSIS — Z12.39 ENCOUNTER FOR OTHER SCREENING FOR MALIGNANT NEOPLASM OF BREAST: ICD-10-CM

## 2024-01-26 DIAGNOSIS — Z98.891 HISTORY OF UTERINE SCAR FROM PREVIOUS SURGERY: Chronic | ICD-10-CM

## 2024-01-26 PROCEDURE — 77063 BREAST TOMOSYNTHESIS BI: CPT | Mod: 26

## 2024-01-26 PROCEDURE — 77067 SCR MAMMO BI INCL CAD: CPT

## 2024-01-26 PROCEDURE — 77063 BREAST TOMOSYNTHESIS BI: CPT

## 2024-01-26 PROCEDURE — 77067 SCR MAMMO BI INCL CAD: CPT | Mod: 26

## 2024-01-30 ENCOUNTER — TRANSCRIPTION ENCOUNTER (OUTPATIENT)
Age: 74
End: 2024-01-30

## 2024-01-30 DIAGNOSIS — R92.30 DENSE BREASTS, UNSPECIFIED: ICD-10-CM

## 2024-01-31 ENCOUNTER — TRANSCRIPTION ENCOUNTER (OUTPATIENT)
Age: 74
End: 2024-01-31

## 2024-02-08 ENCOUNTER — APPOINTMENT (OUTPATIENT)
Dept: ULTRASOUND IMAGING | Facility: CLINIC | Age: 74
End: 2024-02-08

## 2024-02-08 ENCOUNTER — TRANSCRIPTION ENCOUNTER (OUTPATIENT)
Age: 74
End: 2024-02-08

## 2024-03-03 ENCOUNTER — EMERGENCY (EMERGENCY)
Facility: HOSPITAL | Age: 74
LOS: 1 days | Discharge: HOME | End: 2024-03-03
Attending: STUDENT IN AN ORGANIZED HEALTH CARE EDUCATION/TRAINING PROGRAM
Payer: MEDICARE

## 2024-03-03 VITALS
RESPIRATION RATE: 20 BRPM | WEIGHT: 95.02 LBS | HEART RATE: 62 BPM | HEIGHT: 59 IN | DIASTOLIC BLOOD PRESSURE: 79 MMHG | TEMPERATURE: 98 F | OXYGEN SATURATION: 100 % | SYSTOLIC BLOOD PRESSURE: 150 MMHG

## 2024-03-03 VITALS
RESPIRATION RATE: 16 BRPM | HEART RATE: 95 BPM | TEMPERATURE: 98 F | SYSTOLIC BLOOD PRESSURE: 106 MMHG | DIASTOLIC BLOOD PRESSURE: 55 MMHG | OXYGEN SATURATION: 95 %

## 2024-03-03 DIAGNOSIS — Z98.891 HISTORY OF UTERINE SCAR FROM PREVIOUS SURGERY: Chronic | ICD-10-CM

## 2024-03-03 LAB
ADD ON TEST-SPECIMEN IN LAB: SIGNIFICANT CHANGE UP
ADD ON TEST-SPECIMEN IN LAB: SIGNIFICANT CHANGE UP
ALBUMIN SERPL ELPH-MCNC: 4.6 G/DL — SIGNIFICANT CHANGE UP (ref 3.3–5)
ALP SERPL-CCNC: 33 U/L — LOW (ref 40–120)
ALT FLD-CCNC: 27 U/L — SIGNIFICANT CHANGE UP (ref 10–45)
ANION GAP SERPL CALC-SCNC: 10 MMOL/L — SIGNIFICANT CHANGE UP (ref 5–17)
ANION GAP SERPL CALC-SCNC: 12 MMOL/L — SIGNIFICANT CHANGE UP (ref 5–17)
ANION GAP SERPL CALC-SCNC: 9 MMOL/L — SIGNIFICANT CHANGE UP (ref 5–17)
AST SERPL-CCNC: 60 U/L — HIGH (ref 10–40)
BASOPHILS # BLD AUTO: 0.04 K/UL — SIGNIFICANT CHANGE UP (ref 0–0.2)
BASOPHILS NFR BLD AUTO: 0.3 % — SIGNIFICANT CHANGE UP (ref 0–2)
BILIRUB SERPL-MCNC: 0.4 MG/DL — SIGNIFICANT CHANGE UP (ref 0.2–1.2)
BUN SERPL-MCNC: 18 MG/DL — SIGNIFICANT CHANGE UP (ref 7–23)
BUN SERPL-MCNC: 18 MG/DL — SIGNIFICANT CHANGE UP (ref 7–23)
BUN SERPL-MCNC: 19 MG/DL — SIGNIFICANT CHANGE UP (ref 7–23)
CALCIUM SERPL-MCNC: 8.8 MG/DL — SIGNIFICANT CHANGE UP (ref 8.4–10.5)
CALCIUM SERPL-MCNC: 9 MG/DL — SIGNIFICANT CHANGE UP (ref 8.4–10.5)
CALCIUM SERPL-MCNC: 9.4 MG/DL — SIGNIFICANT CHANGE UP (ref 8.4–10.5)
CHLORIDE SERPL-SCNC: 103 MMOL/L — SIGNIFICANT CHANGE UP (ref 96–108)
CHLORIDE SERPL-SCNC: 105 MMOL/L — SIGNIFICANT CHANGE UP (ref 96–108)
CHLORIDE SERPL-SCNC: 105 MMOL/L — SIGNIFICANT CHANGE UP (ref 96–108)
CO2 SERPL-SCNC: 25 MMOL/L — SIGNIFICANT CHANGE UP (ref 22–31)
CREAT SERPL-MCNC: 0.53 MG/DL — SIGNIFICANT CHANGE UP (ref 0.5–1.3)
CREAT SERPL-MCNC: 0.53 MG/DL — SIGNIFICANT CHANGE UP (ref 0.5–1.3)
CREAT SERPL-MCNC: 0.6 MG/DL — SIGNIFICANT CHANGE UP (ref 0.5–1.3)
EGFR: 94 ML/MIN/1.73M2 — SIGNIFICANT CHANGE UP
EGFR: 97 ML/MIN/1.73M2 — SIGNIFICANT CHANGE UP
EGFR: 97 ML/MIN/1.73M2 — SIGNIFICANT CHANGE UP
EOSINOPHIL # BLD AUTO: 0.11 K/UL — SIGNIFICANT CHANGE UP (ref 0–0.5)
EOSINOPHIL NFR BLD AUTO: 0.9 % — SIGNIFICANT CHANGE UP (ref 0–6)
GLUCOSE SERPL-MCNC: 110 MG/DL — HIGH (ref 70–99)
GLUCOSE SERPL-MCNC: 127 MG/DL — HIGH (ref 70–99)
GLUCOSE SERPL-MCNC: 135 MG/DL — HIGH (ref 70–99)
HCT VFR BLD CALC: 43.8 % — SIGNIFICANT CHANGE UP (ref 34.5–45)
HGB BLD-MCNC: 14.3 G/DL — SIGNIFICANT CHANGE UP (ref 11.5–15.5)
IMM GRANULOCYTES NFR BLD AUTO: 0.3 % — SIGNIFICANT CHANGE UP (ref 0–0.9)
LYMPHOCYTES # BLD AUTO: 1.95 K/UL — SIGNIFICANT CHANGE UP (ref 1–3.3)
LYMPHOCYTES # BLD AUTO: 16.5 % — SIGNIFICANT CHANGE UP (ref 13–44)
MCHC RBC-ENTMCNC: 30.4 PG — SIGNIFICANT CHANGE UP (ref 27–34)
MCHC RBC-ENTMCNC: 32.6 GM/DL — SIGNIFICANT CHANGE UP (ref 32–36)
MCV RBC AUTO: 93.2 FL — SIGNIFICANT CHANGE UP (ref 80–100)
MONOCYTES # BLD AUTO: 0.36 K/UL — SIGNIFICANT CHANGE UP (ref 0–0.9)
MONOCYTES NFR BLD AUTO: 3 % — SIGNIFICANT CHANGE UP (ref 2–14)
NEUTROPHILS # BLD AUTO: 9.31 K/UL — HIGH (ref 1.8–7.4)
NEUTROPHILS NFR BLD AUTO: 79 % — HIGH (ref 43–77)
NRBC # BLD: 0 /100 WBCS — SIGNIFICANT CHANGE UP (ref 0–0)
PLATELET # BLD AUTO: 207 K/UL — SIGNIFICANT CHANGE UP (ref 150–400)
POTASSIUM SERPL-MCNC: 4 MMOL/L — SIGNIFICANT CHANGE UP (ref 3.5–5.3)
POTASSIUM SERPL-MCNC: 6.1 MMOL/L — HIGH (ref 3.5–5.3)
POTASSIUM SERPL-MCNC: 6.1 MMOL/L — HIGH (ref 3.5–5.3)
POTASSIUM SERPL-SCNC: 4 MMOL/L — SIGNIFICANT CHANGE UP (ref 3.5–5.3)
POTASSIUM SERPL-SCNC: 6.1 MMOL/L — HIGH (ref 3.5–5.3)
POTASSIUM SERPL-SCNC: 6.1 MMOL/L — HIGH (ref 3.5–5.3)
PROT SERPL-MCNC: 8 G/DL — SIGNIFICANT CHANGE UP (ref 6–8.3)
RBC # BLD: 4.7 M/UL — SIGNIFICANT CHANGE UP (ref 3.8–5.2)
RBC # FLD: 12.5 % — SIGNIFICANT CHANGE UP (ref 10.3–14.5)
SODIUM SERPL-SCNC: 139 MMOL/L — SIGNIFICANT CHANGE UP (ref 135–145)
SODIUM SERPL-SCNC: 140 MMOL/L — SIGNIFICANT CHANGE UP (ref 135–145)
SODIUM SERPL-SCNC: 140 MMOL/L — SIGNIFICANT CHANGE UP (ref 135–145)
TROPONIN T, HIGH SENSITIVITY RESULT: 7 NG/L — SIGNIFICANT CHANGE UP (ref 0–51)
WBC # BLD: 11.81 K/UL — HIGH (ref 3.8–10.5)
WBC # FLD AUTO: 11.81 K/UL — HIGH (ref 3.8–10.5)

## 2024-03-03 PROCEDURE — 83605 ASSAY OF LACTIC ACID: CPT

## 2024-03-03 PROCEDURE — 85018 HEMOGLOBIN: CPT

## 2024-03-03 PROCEDURE — 71045 X-RAY EXAM CHEST 1 VIEW: CPT | Mod: 26

## 2024-03-03 PROCEDURE — 80053 COMPREHEN METABOLIC PANEL: CPT

## 2024-03-03 PROCEDURE — 93005 ELECTROCARDIOGRAM TRACING: CPT

## 2024-03-03 PROCEDURE — 82435 ASSAY OF BLOOD CHLORIDE: CPT

## 2024-03-03 PROCEDURE — 71045 X-RAY EXAM CHEST 1 VIEW: CPT

## 2024-03-03 PROCEDURE — 76705 ECHO EXAM OF ABDOMEN: CPT

## 2024-03-03 PROCEDURE — 82947 ASSAY GLUCOSE BLOOD QUANT: CPT

## 2024-03-03 PROCEDURE — 82330 ASSAY OF CALCIUM: CPT

## 2024-03-03 PROCEDURE — 84484 ASSAY OF TROPONIN QUANT: CPT

## 2024-03-03 PROCEDURE — 83690 ASSAY OF LIPASE: CPT

## 2024-03-03 PROCEDURE — 84295 ASSAY OF SERUM SODIUM: CPT

## 2024-03-03 PROCEDURE — 99285 EMERGENCY DEPT VISIT HI MDM: CPT | Mod: GC

## 2024-03-03 PROCEDURE — 85025 COMPLETE CBC W/AUTO DIFF WBC: CPT

## 2024-03-03 PROCEDURE — 85014 HEMATOCRIT: CPT

## 2024-03-03 PROCEDURE — 99285 EMERGENCY DEPT VISIT HI MDM: CPT | Mod: 25

## 2024-03-03 PROCEDURE — 82803 BLOOD GASES ANY COMBINATION: CPT

## 2024-03-03 PROCEDURE — 84132 ASSAY OF SERUM POTASSIUM: CPT

## 2024-03-03 PROCEDURE — 80048 BASIC METABOLIC PNL TOTAL CA: CPT

## 2024-03-03 PROCEDURE — 76705 ECHO EXAM OF ABDOMEN: CPT | Mod: 26

## 2024-03-03 RX ORDER — ONDANSETRON 8 MG/1
4 TABLET, FILM COATED ORAL ONCE
Refills: 0 | Status: COMPLETED | OUTPATIENT
Start: 2024-03-03 | End: 2024-03-03

## 2024-03-03 RX ORDER — ONDANSETRON 8 MG/1
1 TABLET, FILM COATED ORAL
Qty: 2 | Refills: 0
Start: 2024-03-03 | End: 2024-03-09

## 2024-03-03 RX ORDER — FAMOTIDINE 10 MG/ML
20 INJECTION INTRAVENOUS ONCE
Refills: 0 | Status: COMPLETED | OUTPATIENT
Start: 2024-03-03 | End: 2024-03-03

## 2024-03-03 RX ORDER — SODIUM CHLORIDE 9 MG/ML
1000 INJECTION INTRAMUSCULAR; INTRAVENOUS; SUBCUTANEOUS ONCE
Refills: 0 | Status: COMPLETED | OUTPATIENT
Start: 2024-03-03 | End: 2024-03-03

## 2024-03-03 RX ORDER — ONDANSETRON 8 MG/1
4 TABLET, FILM COATED ORAL ONCE
Refills: 0 | Status: DISCONTINUED | OUTPATIENT
Start: 2024-03-03 | End: 2024-03-03

## 2024-03-03 RX ADMIN — FAMOTIDINE 20 MILLIGRAM(S): 10 INJECTION INTRAVENOUS at 02:31

## 2024-03-03 RX ADMIN — SODIUM CHLORIDE 1000 MILLILITER(S): 9 INJECTION INTRAMUSCULAR; INTRAVENOUS; SUBCUTANEOUS at 04:31

## 2024-03-03 RX ADMIN — ONDANSETRON 4 MILLIGRAM(S): 8 TABLET, FILM COATED ORAL at 02:31

## 2024-03-03 NOTE — ED ADULT NURSE NOTE - OBJECTIVE STATEMENT
72 yo FM AOx4 from home with PMH of Rheumatoid arthritis, migraines, patent foramen ovale, mitral valve prolapse, and bronchiolectasis c/o mid sternal chest pressure, palpitations, abd discomfort, nausea, and one episode of vomiting. Pt states onset of symptoms started at midnight. Pt endorses abd discomfort has resolved. When pt was brought back from triage to room, pt had one episode of emesis- pt denies nausea and vomiting on initial assessment. Pt reports RLQ abd discomfort. Pt denies any increased stress or increased exertional activity. EKG done in triage. Pt placed on CM- NSR. Pt initially presents to [facility] ED in no acute distress with unlabored breathing. Pt abdomen soft and nondistended. Call bell in reach. Stretcher in lowest position locked with blanket given. Comfort care and safety measures provided. Pt denies sob, diarrhea, fevers, chills, headache, dizziness, dysuria, blood in urine and stool.

## 2024-03-03 NOTE — ED PROVIDER NOTE - PROGRESS NOTE DETAILS
Cy Slaughter,  (PGY-1) Patient reevaluated at bedside. Patient continued w n/v and had an episode of diarrhea. Epigastric abd pain/ruq SNTND. Labs noteable for lipase 161 will give fluids, reeval. Anticipating DC with zofran and f/u with her GI outpatient. patient amenable with plan.

## 2024-03-03 NOTE — ED PROVIDER NOTE - NSFOLLOWUPINSTRUCTIONS_ED_ALL_ED_FT
Abdominal Pain    Many things can cause abdominal pain. Many times, abdominal pain is not caused by a disease and will improve without treatment. Your health care provider will do a physical exam to determine if there is a dangerous cause of your pain; blood tests and imaging may help determine the cause of your pain. However, in many cases, no cause may be found and you may need further testing as an outpatient. Monitor your abdominal pain for any changes.     SEEK IMMEDIATE MEDICAL CARE IF YOU HAVE ANY OF THE FOLLOWING SYMPTOMS: worsening abdominal pain, uncontrollable vomiting, profuse diarrhea, inability to have bowel movements or pass gas, black or bloody stools, fever accompanying chest pain or back pain, or fainting. These symptoms may represent a serious problem that is an emergency. Do not wait to see if the symptoms will go away. Get medical help right away. Call 911 and do not drive yourself to the hospital. Please follow up with your PCP and gastroenterology doctors.   For your nausea you may take zofran sent to your pharmacy as directed.   Drink plenty of fluids   Abdominal Pain    Many things can cause abdominal pain. Many times, abdominal pain is not caused by a disease and will improve without treatment. Your health care provider will do a physical exam to determine if there is a dangerous cause of your pain; blood tests and imaging may help determine the cause of your pain. However, in many cases, no cause may be found and you may need further testing as an outpatient. Monitor your abdominal pain for any changes.     SEEK IMMEDIATE MEDICAL CARE IF YOU HAVE ANY OF THE FOLLOWING SYMPTOMS: worsening abdominal pain, uncontrollable vomiting, profuse diarrhea, inability to have bowel movements or pass gas, black or bloody stools, fever accompanying chest pain or back pain, or fainting. These symptoms may represent a serious problem that is an emergency. Do not wait to see if the symptoms will go away. Get medical help right away. Call 911 and do not drive yourself to the hospital.

## 2024-03-03 NOTE — ED PROVIDER NOTE - CLINICAL SUMMARY MEDICAL DECISION MAKING FREE TEXT BOX
72 y/o F PMHx RA, MGUS, Mitral valve prolapse, osteoporosis, pulmonary arteriovenous malformation s/p coil, raynauds, IBS presents c/o abdominal pain radiating into her chest. Patient follows with cardiologist Dr. Lowry who saw her last in 09/2023. She states tonight around 11pm she was lying down in bed when she began to have some generalized abdominal pain which she describes as a burning sensation. Notes it later spread upward into her chest and she is now having some nausea. Does not radiate elsewhere. Had one episode of NBNB  vomitting. Denies SOB, palpitations, fevers, chills, cough, dysuria, dark or bloody stools.  VSS, afebrile  EKG NSR no MULUGETA, STD  On exam patient well appearing, some pallor. Abd SNTND  DDx- ACS- low suspicion for ischemic chest pain will send trop vs GERD. Low suspicion for AAA iso equal pulses b/l. Very low suspicion for intraabdominal surgical pathology. 74 y/o F PMHx RA, MGUS, Mitral valve prolapse, osteoporosis, pulmonary arteriovenous malformation s/p coil, raynauds, IBS presents c/o abdominal pain radiating into her chest. Patient follows with cardiologist Dr. Lowry who saw her last in 09/2023. She states tonight around 11pm she was lying down in bed when she began to have some generalized abdominal pain which she describes as a burning sensation. Notes it later spread upward into her chest and she is now having some nausea. Does not radiate elsewhere. Had one episode of NBNB  vomitting. Denies SOB, palpitations, fevers, chills, cough, dysuria, dark or bloody stools.  VSS, afebrile  EKG NSR no MULUGETA, STD  On exam patient well appearing, some pallor. Abd SNTND  DDx- ACS- low suspicion for ischemic chest pain will send trop vs GERD. Low suspicion for AAA iso equal pulses b/l. Very low suspicion for intraabdominal surgical pathology. Will send lipase to evaluate for pancreatitis.

## 2024-03-03 NOTE — ED PROVIDER NOTE - PATIENT PORTAL LINK FT
You can access the FollowMyHealth Patient Portal offered by [facility] by registering at the following website: [email or web address]. By joining [employer]’s FollowMyHealth portal, you will also be able to view your health information using other applications (apps) compatible with our system.

## 2024-03-03 NOTE — ED PROVIDER NOTE - ATTENDING CONTRIBUTION TO CARE
I, Catalino Roberson, performed a history and physical exam of the patient and discussed their management with the resident and/or advanced care provider. I reviewed the resident and/or advanced care provider's note and agree with the documented findings and plan of care. I was present and available for all procedures.    72 y/o F PMHx RA, MGUS, Mitral valve prolapse, osteoporosis, pulmonary arteriovenous malformation s/p coil, raynauds, IBS presents c/o abdominal pain radiating into her chest. Patient follows with cardiologist Dr. Lowry who saw her last in 09/2023. She states tonight around 11pm she was lying down in bed when she began to have some generalized abdominal pain which she describes as a burning sensation. Notes it later spread upward into her chest and she is now having some nausea. Does not radiate elsewhere. Had one episode of NBNB  vomiting. Denies SOB, palpitations, fevers, chills, cough, dysuria, dark or bloody stools.    Well appearing and in NAD, head normal appearing atraumatic, trachea midline, no respiratory distress, lungs cta bilaterally, rrr no murmurs, soft slight epigastric ttp no rt no murphys no other abd ttp, ND abdomen, no visible extremity deformities, Alert and oriented, non focal neuro exam, skin warm and dry, normal affect and mood, no leg swelling, calf ttp or jvd    pw chest pain/epigastric pain eval for silver colic otherwise, ekg. pt on monitor, enzymes, cxr for pneumothorax or infiltrates, less likely PE, unlikely dissection or AAA will disposition based on heart score discussed with patient agreed with plan.     Patient with mild pancreatitis reassessed feeling much better will DC with medications return precautions otherwise ultrasound unremarkable for biliary disease

## 2024-03-03 NOTE — ED PROVIDER NOTE - PHYSICAL EXAMINATION
General: well appearing, interactive, well nourished, no apparent distress, ncat  HEENT: EOMI, PERRLA, normal mucosa, normal oropharynx, no lesions on the lips or on oral mucosa, normal external ear  Neck: supple, no lymphadenopathy, full range of motion, no nuchal rigidity  CV: RRR, normal S1 and S2 with no murmur, capillary refill less than two seconds  Resp: lungs CTA b/l, good aeration bilaterally, symmetric chest wall   Abd: non-distended, soft, non-tender  : no CVA tenderness  MSK: full range of motion, no cyanosis, no edema, no clubbing, no immobility  Neuro: CN II-XII grossly intact, muscle strength 5/5 in all extremities, normal gait  Skin: +pallor, no rashes, skin intact

## 2024-03-04 ENCOUNTER — TRANSCRIPTION ENCOUNTER (OUTPATIENT)
Age: 74
End: 2024-03-04

## 2024-03-06 ENCOUNTER — TRANSCRIPTION ENCOUNTER (OUTPATIENT)
Age: 74
End: 2024-03-06

## 2024-03-06 ENCOUNTER — NON-APPOINTMENT (OUTPATIENT)
Age: 74
End: 2024-03-06

## 2024-03-26 ENCOUNTER — APPOINTMENT (OUTPATIENT)
Dept: INTERNAL MEDICINE | Facility: CLINIC | Age: 74
End: 2024-03-26
Payer: MEDICARE

## 2024-03-26 VITALS
SYSTOLIC BLOOD PRESSURE: 144 MMHG | HEIGHT: 58.5 IN | DIASTOLIC BLOOD PRESSURE: 76 MMHG | HEART RATE: 88 BPM | BODY MASS INDEX: 19.21 KG/M2 | WEIGHT: 94 LBS | OXYGEN SATURATION: 96 %

## 2024-03-26 DIAGNOSIS — D47.2 MONOCLONAL GAMMOPATHY: ICD-10-CM

## 2024-03-26 DIAGNOSIS — R73.03 PREDIABETES.: ICD-10-CM

## 2024-03-26 DIAGNOSIS — M81.0 AGE-RELATED OSTEOPOROSIS W/OUT CURRENT PATHOLOGICAL FRACTURE: ICD-10-CM

## 2024-03-26 DIAGNOSIS — Z00.00 ENCOUNTER FOR GENERAL ADULT MEDICAL EXAMINATION W/OUT ABNORMAL FINDINGS: ICD-10-CM

## 2024-03-26 PROCEDURE — G0439: CPT

## 2024-03-26 NOTE — HISTORY OF PRESENT ILLNESS
[de-identified] : 74 yo F with hx bronchiectasis, HLD, MGUS, osteoporosis, MVP, RA, IBS presents for annual.  3/3 went to ED for n/v/abd pain/diarrhea. RUQ US showing GB polyps which she has known history for. WBC 11, lipase 161. Sx mostly resolved but having GI issues. Will be following with GI.   osteoporosis-- reclast infusions. Due for dexa this year. Sees rheumatology, will be getting through them.   Follows with hematology as well for MGUS.  Cardiology - Dr. Lowry

## 2024-03-26 NOTE — HEALTH RISK ASSESSMENT
[Patient reported mammogram was normal] : Patient reported mammogram was normal [Patient reported colonoscopy was normal] : Patient reported colonoscopy was normal [Fully functional (bathing, dressing, toileting, transferring, walking, feeding)] : Fully functional (bathing, dressing, toileting, transferring, walking, feeding) [Fully functional (using the telephone, shopping, preparing meals, housekeeping, doing laundry, using] : Fully functional and needs no help or supervision to perform IADLs (using the telephone, shopping, preparing meals, housekeeping, doing laundry, using transportation, managing medications and managing finances) [No falls in past year] : Patient reported no falls in the past year [Patient reported bone density results were abnormal] : Patient reported bone density results were abnormal [Never] : Never [MammogramDate] : 1/24 [BoneDensityComments] : Due this year-- followed by rheumatology [BoneDensityDate] : 2022 [ColonoscopyDate] : 07/21 [ColonoscopyComments] : due this year

## 2024-03-28 ENCOUNTER — TRANSCRIPTION ENCOUNTER (OUTPATIENT)
Age: 74
End: 2024-03-28

## 2024-03-28 LAB
25(OH)D3 SERPL-MCNC: 41.4 NG/ML
ALBUMIN SERPL ELPH-MCNC: 4.2 G/DL
ALP BLD-CCNC: 37 U/L
ALT SERPL-CCNC: 14 U/L
ANION GAP SERPL CALC-SCNC: 11 MMOL/L
AST SERPL-CCNC: 16 U/L
BASOPHILS # BLD AUTO: 0.03 K/UL
BASOPHILS NFR BLD AUTO: 0.6 %
BILIRUB SERPL-MCNC: 0.5 MG/DL
BUN SERPL-MCNC: 15 MG/DL
CALCIUM SERPL-MCNC: 9.3 MG/DL
CHLORIDE SERPL-SCNC: 106 MMOL/L
CHOLEST SERPL-MCNC: 214 MG/DL
CO2 SERPL-SCNC: 27 MMOL/L
CREAT SERPL-MCNC: 0.6 MG/DL
EGFR: 94 ML/MIN/1.73M2
EOSINOPHIL # BLD AUTO: 0.16 K/UL
EOSINOPHIL NFR BLD AUTO: 3 %
ESTIMATED AVERAGE GLUCOSE: 105 MG/DL
GLUCOSE SERPL-MCNC: 92 MG/DL
HBA1C MFR BLD HPLC: 5.3 %
HCT VFR BLD CALC: 41.4 %
HDLC SERPL-MCNC: 68 MG/DL
HGB BLD-MCNC: 13.4 G/DL
IMM GRANULOCYTES NFR BLD AUTO: 0.4 %
LDLC SERPL CALC-MCNC: 126 MG/DL
LYMPHOCYTES # BLD AUTO: 1.56 K/UL
LYMPHOCYTES NFR BLD AUTO: 28.9 %
MAN DIFF?: NORMAL
MCHC RBC-ENTMCNC: 30.5 PG
MCHC RBC-ENTMCNC: 32.4 GM/DL
MCV RBC AUTO: 94.1 FL
MONOCYTES # BLD AUTO: 0.3 K/UL
MONOCYTES NFR BLD AUTO: 5.6 %
NEUTROPHILS # BLD AUTO: 3.33 K/UL
NEUTROPHILS NFR BLD AUTO: 61.5 %
NONHDLC SERPL-MCNC: 146 MG/DL
PLATELET # BLD AUTO: 246 K/UL
POTASSIUM SERPL-SCNC: 4.1 MMOL/L
PROT SERPL-MCNC: 6.6 G/DL
RBC # BLD: 4.4 M/UL
RBC # FLD: 13.2 %
SODIUM SERPL-SCNC: 144 MMOL/L
TRIGL SERPL-MCNC: 112 MG/DL
TSH SERPL-ACNC: 0.74 UIU/ML
VIT B12 SERPL-MCNC: 479 PG/ML
WBC # FLD AUTO: 5.4 K/UL

## 2024-03-29 ENCOUNTER — TRANSCRIPTION ENCOUNTER (OUTPATIENT)
Age: 74
End: 2024-03-29

## 2024-04-08 ENCOUNTER — APPOINTMENT (OUTPATIENT)
Dept: GASTROENTEROLOGY | Facility: CLINIC | Age: 74
End: 2024-04-08

## 2024-05-03 ENCOUNTER — NON-APPOINTMENT (OUTPATIENT)
Age: 74
End: 2024-05-03

## 2024-05-03 ENCOUNTER — APPOINTMENT (OUTPATIENT)
Dept: CARDIOLOGY | Facility: CLINIC | Age: 74
End: 2024-05-03
Payer: MEDICARE

## 2024-05-03 VITALS
WEIGHT: 94 LBS | DIASTOLIC BLOOD PRESSURE: 76 MMHG | SYSTOLIC BLOOD PRESSURE: 120 MMHG | BODY MASS INDEX: 19.73 KG/M2 | OXYGEN SATURATION: 100 % | HEART RATE: 75 BPM | HEIGHT: 58 IN

## 2024-05-03 DIAGNOSIS — I34.1 NONRHEUMATIC MITRAL (VALVE) PROLAPSE: ICD-10-CM

## 2024-05-03 DIAGNOSIS — I31.39 OTHER PERICARDIAL EFFUSION (NONINFLAMMATORY): ICD-10-CM

## 2024-05-03 DIAGNOSIS — Z87.898 PERSONAL HISTORY OF OTHER SPECIFIED CONDITIONS: ICD-10-CM

## 2024-05-03 DIAGNOSIS — E78.00 PURE HYPERCHOLESTEROLEMIA, UNSPECIFIED: ICD-10-CM

## 2024-05-03 PROCEDURE — G2211 COMPLEX E/M VISIT ADD ON: CPT

## 2024-05-03 PROCEDURE — 93000 ELECTROCARDIOGRAM COMPLETE: CPT

## 2024-05-03 PROCEDURE — 99214 OFFICE O/P EST MOD 30 MIN: CPT

## 2024-05-04 PROBLEM — I34.1 MITRAL VALVE PROLAPSE: Status: ACTIVE | Noted: 2019-11-22

## 2024-05-16 ENCOUNTER — TRANSCRIPTION ENCOUNTER (OUTPATIENT)
Age: 74
End: 2024-05-16

## 2024-05-20 ENCOUNTER — TRANSCRIPTION ENCOUNTER (OUTPATIENT)
Age: 74
End: 2024-05-20

## 2024-05-21 ENCOUNTER — TRANSCRIPTION ENCOUNTER (OUTPATIENT)
Age: 74
End: 2024-05-21

## 2024-05-22 ENCOUNTER — OUTPATIENT (OUTPATIENT)
Dept: OUTPATIENT SERVICES | Facility: HOSPITAL | Age: 74
LOS: 1 days | End: 2024-05-22
Payer: MEDICARE

## 2024-05-22 ENCOUNTER — APPOINTMENT (OUTPATIENT)
Dept: ULTRASOUND IMAGING | Facility: IMAGING CENTER | Age: 74
End: 2024-05-22
Payer: MEDICARE

## 2024-05-22 DIAGNOSIS — Z98.891 HISTORY OF UTERINE SCAR FROM PREVIOUS SURGERY: Chronic | ICD-10-CM

## 2024-05-22 DIAGNOSIS — E04.1 NONTOXIC SINGLE THYROID NODULE: ICD-10-CM

## 2024-05-22 PROCEDURE — 76536 US EXAM OF HEAD AND NECK: CPT | Mod: 26

## 2024-05-22 PROCEDURE — 76536 US EXAM OF HEAD AND NECK: CPT

## 2024-05-28 ENCOUNTER — RESULT REVIEW (OUTPATIENT)
Age: 74
End: 2024-05-28

## 2024-05-28 ENCOUNTER — TRANSCRIPTION ENCOUNTER (OUTPATIENT)
Age: 74
End: 2024-05-28

## 2024-05-28 DIAGNOSIS — E04.1 NONTOXIC SINGLE THYROID NODULE: ICD-10-CM

## 2024-05-29 ENCOUNTER — TRANSCRIPTION ENCOUNTER (OUTPATIENT)
Age: 74
End: 2024-05-29

## 2024-05-30 ENCOUNTER — TRANSCRIPTION ENCOUNTER (OUTPATIENT)
Age: 74
End: 2024-05-30

## 2024-06-04 ENCOUNTER — RESULT REVIEW (OUTPATIENT)
Age: 74
End: 2024-06-04

## 2024-06-04 ENCOUNTER — OUTPATIENT (OUTPATIENT)
Dept: OUTPATIENT SERVICES | Facility: HOSPITAL | Age: 74
LOS: 1 days | End: 2024-06-04
Payer: MEDICARE

## 2024-06-04 ENCOUNTER — APPOINTMENT (OUTPATIENT)
Dept: ULTRASOUND IMAGING | Facility: IMAGING CENTER | Age: 74
End: 2024-06-04
Payer: MEDICARE

## 2024-06-04 DIAGNOSIS — Z98.891 HISTORY OF UTERINE SCAR FROM PREVIOUS SURGERY: Chronic | ICD-10-CM

## 2024-06-04 DIAGNOSIS — E04.1 NONTOXIC SINGLE THYROID NODULE: ICD-10-CM

## 2024-06-04 PROCEDURE — 88173 CYTOPATH EVAL FNA REPORT: CPT

## 2024-06-04 PROCEDURE — 88173 CYTOPATH EVAL FNA REPORT: CPT | Mod: 26

## 2024-06-04 PROCEDURE — 10005 FNA BX W/US GDN 1ST LES: CPT

## 2024-06-04 PROCEDURE — 88172 CYTP DX EVAL FNA 1ST EA SITE: CPT

## 2024-06-07 ENCOUNTER — TRANSCRIPTION ENCOUNTER (OUTPATIENT)
Age: 74
End: 2024-06-07

## 2024-06-07 LAB — NON-GYNECOLOGICAL CYTOLOGY STUDY: SIGNIFICANT CHANGE UP

## 2024-06-10 ENCOUNTER — TRANSCRIPTION ENCOUNTER (OUTPATIENT)
Age: 74
End: 2024-06-10

## 2024-08-11 PROBLEM — R42 VERTIGO: Status: ACTIVE | Noted: 2024-08-11

## 2024-08-14 ENCOUNTER — APPOINTMENT (OUTPATIENT)
Dept: CT IMAGING | Facility: IMAGING CENTER | Age: 74
End: 2024-08-14
Payer: MEDICARE

## 2024-08-14 PROCEDURE — 70450 CT HEAD/BRAIN W/O DYE: CPT | Mod: 26,MH

## 2024-09-10 ENCOUNTER — APPOINTMENT (OUTPATIENT)
Dept: DERMATOLOGY | Facility: CLINIC | Age: 74
End: 2024-09-10
Payer: MEDICARE

## 2024-09-10 DIAGNOSIS — L91.0 HYPERTROPHIC SCAR: ICD-10-CM

## 2024-09-10 DIAGNOSIS — L85.3 XEROSIS CUTIS: ICD-10-CM

## 2024-09-10 DIAGNOSIS — L82.0 INFLAMED SEBORRHEIC KERATOSIS: ICD-10-CM

## 2024-09-10 DIAGNOSIS — D69.2 OTHER NONTHROMBOCYTOPENIC PURPURA: ICD-10-CM

## 2024-09-10 PROCEDURE — 99214 OFFICE O/P EST MOD 30 MIN: CPT

## 2024-09-10 RX ORDER — HYDROCORTISONE 1 %
12 CREAM (GRAM) TOPICAL
Qty: 1 | Refills: 3 | Status: ACTIVE | COMMUNITY
Start: 2024-09-10 | End: 1900-01-01

## 2024-09-10 RX ORDER — TRIAMCINOLONE ACETONIDE 1 MG/G
0.1 OINTMENT TOPICAL
Qty: 1 | Refills: 2 | Status: ACTIVE | COMMUNITY
Start: 2024-09-10 | End: 1900-01-01

## 2024-09-10 NOTE — ASSESSMENT
[FreeTextEntry1] : #Hypertrophic scar, inflamed, L shoulder - Counseled patient on benign clinical findings - Reassurance provided - Discussed ILK to reduce inflammation and flatten out scar but pt defers at this time - START triamcinolone 0.1% ointment twice daily to affected areas - Proper topical steroid use and side effects discussed, including cutaneous atrophy, telangiectasias, and striae. Avoid long-term use.  #Seborrheic keratoses, irritated, R neck - Counseled patient on benign clinical findings - Reassurance provided - Discussed cryotherapy but pt defers at this time  - START triamcinolone 0.1% ointment twice daily to affected areas - Proper topical steroid use and side effects discussed, including cutaneous atrophy, telangiectasias, and striae. Avoid long-term use. - START ammonium lactate 12% lotion twice daily to affected areas  #Solar purpura, R dorsal hand - Counseled patient on benign clinical findings - age-related bruising due to increased skin laxity with age - Reassurance provided - Discussed OTC Arnica for bruising prevention   #Xerosis, bilateral feet, chronic, flaring  - We have discussed the nature and course of this condition - Discussed gentle skin care regimen including short, lukewarm showers, liberal application of moisturizers, and use of fragrance-free products - START ammonium lactate 12% lotion twice daily to affected areas  RTC for FBSE in December 2024

## 2024-09-10 NOTE — HISTORY OF PRESENT ILLNESS
[FreeTextEntry1] : F/u moles [de-identified] : Ms. ANNETTE TROTTER is a 73-year-old female PMH RA who presents for:    1) Bump, L shoulder - Duration: 1 month - Symptoms: itchy - Prior tx: none  2) Moles - Duration: years - Symptoms: itchy - Prior tx: none   Derm Hx: SCC of nose s/p removal - pt denies evidence of recurrence Family Hx: no family hx of skin cancer Social Hx: retired, previously was a teacher and high

## 2024-12-04 DIAGNOSIS — E78.00 PURE HYPERCHOLESTEROLEMIA, UNSPECIFIED: ICD-10-CM

## 2024-12-04 DIAGNOSIS — R25.1 TREMOR, UNSPECIFIED: ICD-10-CM

## 2024-12-04 DIAGNOSIS — R73.03 PREDIABETES.: ICD-10-CM

## 2024-12-04 DIAGNOSIS — Z00.00 ENCOUNTER FOR GENERAL ADULT MEDICAL EXAMINATION W/OUT ABNORMAL FINDINGS: ICD-10-CM

## 2024-12-04 DIAGNOSIS — E04.1 NONTOXIC SINGLE THYROID NODULE: ICD-10-CM

## 2024-12-04 DIAGNOSIS — M81.0 AGE-RELATED OSTEOPOROSIS W/OUT CURRENT PATHOLOGICAL FRACTURE: ICD-10-CM

## 2024-12-05 ENCOUNTER — TRANSCRIPTION ENCOUNTER (OUTPATIENT)
Age: 74
End: 2024-12-05

## 2024-12-06 ENCOUNTER — TRANSCRIPTION ENCOUNTER (OUTPATIENT)
Age: 74
End: 2024-12-06

## 2024-12-19 ENCOUNTER — APPOINTMENT (OUTPATIENT)
Dept: DERMATOLOGY | Facility: CLINIC | Age: 74
End: 2024-12-19
Payer: MEDICARE

## 2024-12-19 VITALS — BODY MASS INDEX: 19.73 KG/M2 | WEIGHT: 94 LBS | HEIGHT: 58 IN

## 2024-12-19 PROCEDURE — 99213 OFFICE O/P EST LOW 20 MIN: CPT

## 2025-02-26 ENCOUNTER — NON-APPOINTMENT (OUTPATIENT)
Age: 75
End: 2025-02-26

## 2025-02-26 ENCOUNTER — APPOINTMENT (OUTPATIENT)
Dept: CARDIOLOGY | Facility: CLINIC | Age: 75
End: 2025-02-26
Payer: MEDICARE

## 2025-02-26 VITALS
DIASTOLIC BLOOD PRESSURE: 72 MMHG | HEIGHT: 58 IN | TEMPERATURE: 97.8 F | WEIGHT: 93 LBS | HEART RATE: 88 BPM | SYSTOLIC BLOOD PRESSURE: 124 MMHG | BODY MASS INDEX: 19.52 KG/M2 | OXYGEN SATURATION: 100 %

## 2025-02-26 DIAGNOSIS — R07.89 OTHER CHEST PAIN: ICD-10-CM

## 2025-02-26 PROCEDURE — G2211 COMPLEX E/M VISIT ADD ON: CPT

## 2025-02-26 PROCEDURE — 93000 ELECTROCARDIOGRAM COMPLETE: CPT

## 2025-02-26 PROCEDURE — 99214 OFFICE O/P EST MOD 30 MIN: CPT

## 2025-02-28 ENCOUNTER — APPOINTMENT (OUTPATIENT)
Dept: CV DIAGNOSITCS | Facility: HOSPITAL | Age: 75
End: 2025-02-28

## 2025-02-28 ENCOUNTER — OUTPATIENT (OUTPATIENT)
Dept: OUTPATIENT SERVICES | Facility: HOSPITAL | Age: 75
LOS: 1 days | End: 2025-02-28
Payer: MEDICARE

## 2025-02-28 ENCOUNTER — RESULT REVIEW (OUTPATIENT)
Age: 75
End: 2025-02-28

## 2025-02-28 DIAGNOSIS — Z98.891 HISTORY OF UTERINE SCAR FROM PREVIOUS SURGERY: Chronic | ICD-10-CM

## 2025-02-28 DIAGNOSIS — R07.89 OTHER CHEST PAIN: ICD-10-CM

## 2025-02-28 PROCEDURE — 93306 TTE W/DOPPLER COMPLETE: CPT | Mod: 26

## 2025-03-27 ENCOUNTER — APPOINTMENT (OUTPATIENT)
Dept: INTERNAL MEDICINE | Facility: CLINIC | Age: 75
End: 2025-03-27
Payer: MEDICARE

## 2025-03-27 VITALS — DIASTOLIC BLOOD PRESSURE: 74 MMHG | SYSTOLIC BLOOD PRESSURE: 110 MMHG

## 2025-03-27 VITALS
DIASTOLIC BLOOD PRESSURE: 66 MMHG | HEIGHT: 58 IN | WEIGHT: 94 LBS | OXYGEN SATURATION: 98 % | BODY MASS INDEX: 19.73 KG/M2 | HEART RATE: 67 BPM | SYSTOLIC BLOOD PRESSURE: 135 MMHG

## 2025-03-27 DIAGNOSIS — M06.9 RHEUMATOID ARTHRITIS, UNSPECIFIED: ICD-10-CM

## 2025-03-27 DIAGNOSIS — E04.1 NONTOXIC SINGLE THYROID NODULE: ICD-10-CM

## 2025-03-27 DIAGNOSIS — Z00.00 ENCOUNTER FOR GENERAL ADULT MEDICAL EXAMINATION W/OUT ABNORMAL FINDINGS: ICD-10-CM

## 2025-03-27 PROCEDURE — G2211 COMPLEX E/M VISIT ADD ON: CPT

## 2025-03-27 PROCEDURE — 99214 OFFICE O/P EST MOD 30 MIN: CPT | Mod: 25

## 2025-03-27 PROCEDURE — G0439: CPT

## 2025-03-31 ENCOUNTER — APPOINTMENT (OUTPATIENT)
Dept: ULTRASOUND IMAGING | Facility: IMAGING CENTER | Age: 75
End: 2025-03-31
Payer: MEDICARE

## 2025-03-31 ENCOUNTER — OUTPATIENT (OUTPATIENT)
Dept: OUTPATIENT SERVICES | Facility: HOSPITAL | Age: 75
LOS: 1 days | End: 2025-03-31
Payer: MEDICARE

## 2025-03-31 DIAGNOSIS — E04.1 NONTOXIC SINGLE THYROID NODULE: ICD-10-CM

## 2025-03-31 DIAGNOSIS — Z98.891 HISTORY OF UTERINE SCAR FROM PREVIOUS SURGERY: Chronic | ICD-10-CM

## 2025-03-31 PROCEDURE — 76536 US EXAM OF HEAD AND NECK: CPT

## 2025-03-31 PROCEDURE — 76536 US EXAM OF HEAD AND NECK: CPT | Mod: 26

## 2025-04-04 ENCOUNTER — APPOINTMENT (OUTPATIENT)
Dept: CARDIOLOGY | Facility: CLINIC | Age: 75
End: 2025-04-04
Payer: MEDICARE

## 2025-04-04 ENCOUNTER — NON-APPOINTMENT (OUTPATIENT)
Age: 75
End: 2025-04-04

## 2025-04-04 VITALS
WEIGHT: 93.31 LBS | HEART RATE: 80 BPM | BODY MASS INDEX: 19.58 KG/M2 | SYSTOLIC BLOOD PRESSURE: 112 MMHG | DIASTOLIC BLOOD PRESSURE: 70 MMHG | OXYGEN SATURATION: 98 % | HEIGHT: 58 IN

## 2025-04-04 DIAGNOSIS — E78.00 PURE HYPERCHOLESTEROLEMIA, UNSPECIFIED: ICD-10-CM

## 2025-04-04 DIAGNOSIS — Z87.898 PERSONAL HISTORY OF OTHER SPECIFIED CONDITIONS: ICD-10-CM

## 2025-04-04 DIAGNOSIS — I31.39 OTHER PERICARDIAL EFFUSION (NONINFLAMMATORY): ICD-10-CM

## 2025-04-04 DIAGNOSIS — I34.1 NONRHEUMATIC MITRAL (VALVE) PROLAPSE: ICD-10-CM

## 2025-04-04 DIAGNOSIS — R07.89 OTHER CHEST PAIN: ICD-10-CM

## 2025-04-04 DIAGNOSIS — Z13.6 ENCOUNTER FOR SCREENING FOR CARDIOVASCULAR DISORDERS: ICD-10-CM

## 2025-04-04 PROCEDURE — 99214 OFFICE O/P EST MOD 30 MIN: CPT

## 2025-04-04 PROCEDURE — 93000 ELECTROCARDIOGRAM COMPLETE: CPT

## 2025-04-04 PROCEDURE — G2211 COMPLEX E/M VISIT ADD ON: CPT

## 2025-04-07 ENCOUNTER — TRANSCRIPTION ENCOUNTER (OUTPATIENT)
Age: 75
End: 2025-04-07

## 2025-04-09 ENCOUNTER — TRANSCRIPTION ENCOUNTER (OUTPATIENT)
Age: 75
End: 2025-04-09

## 2025-04-10 ENCOUNTER — TRANSCRIPTION ENCOUNTER (OUTPATIENT)
Age: 75
End: 2025-04-10

## 2025-04-10 DIAGNOSIS — M54.12 RADICULOPATHY, CERVICAL REGION: ICD-10-CM

## 2025-04-11 ENCOUNTER — TRANSCRIPTION ENCOUNTER (OUTPATIENT)
Age: 75
End: 2025-04-11

## 2025-05-01 DIAGNOSIS — M54.16 RADICULOPATHY, LUMBAR REGION: ICD-10-CM

## 2025-05-02 ENCOUNTER — TRANSCRIPTION ENCOUNTER (OUTPATIENT)
Age: 75
End: 2025-05-02

## 2025-05-21 ENCOUNTER — APPOINTMENT (OUTPATIENT)
Dept: MRI IMAGING | Facility: IMAGING CENTER | Age: 75
End: 2025-05-21
Payer: MEDICARE

## 2025-05-21 ENCOUNTER — OUTPATIENT (OUTPATIENT)
Dept: OUTPATIENT SERVICES | Facility: HOSPITAL | Age: 75
LOS: 1 days | End: 2025-05-21
Payer: MEDICARE

## 2025-05-21 ENCOUNTER — APPOINTMENT (OUTPATIENT)
Dept: OBGYN | Facility: CLINIC | Age: 75
End: 2025-05-21
Payer: MEDICARE

## 2025-05-21 VITALS
DIASTOLIC BLOOD PRESSURE: 72 MMHG | SYSTOLIC BLOOD PRESSURE: 139 MMHG | HEIGHT: 58.5 IN | WEIGHT: 93 LBS | BODY MASS INDEX: 19 KG/M2

## 2025-05-21 DIAGNOSIS — M81.0 AGE-RELATED OSTEOPOROSIS W/OUT CURRENT PATHOLOGICAL FRACTURE: ICD-10-CM

## 2025-05-21 DIAGNOSIS — Z13.31 ENCOUNTER FOR SCREENING FOR DEPRESSION: ICD-10-CM

## 2025-05-21 DIAGNOSIS — Z01.419 ENCOUNTER FOR GYNECOLOGICAL EXAMINATION (GENERAL) (ROUTINE) W/OUT ABNORMAL FINDINGS: ICD-10-CM

## 2025-05-21 DIAGNOSIS — A31.0 PULMONARY MYCOBACTERIAL INFECTION: ICD-10-CM

## 2025-05-21 DIAGNOSIS — Z80.0 FAMILY HISTORY OF MALIGNANT NEOPLASM OF DIGESTIVE ORGANS: ICD-10-CM

## 2025-05-21 DIAGNOSIS — M06.9 RHEUMATOID ARTHRITIS, UNSPECIFIED: ICD-10-CM

## 2025-05-21 DIAGNOSIS — J47.9 BRONCHIECTASIS, UNCOMPLICATED: ICD-10-CM

## 2025-05-21 DIAGNOSIS — Q25.72 CONGENITAL PULMONARY ARTERIOVENOUS MALFORMATION: ICD-10-CM

## 2025-05-21 DIAGNOSIS — Z98.891 HISTORY OF UTERINE SCAR FROM PREVIOUS SURGERY: Chronic | ICD-10-CM

## 2025-05-21 DIAGNOSIS — Z00.8 ENCOUNTER FOR OTHER GENERAL EXAMINATION: ICD-10-CM

## 2025-05-21 PROCEDURE — G0101: CPT

## 2025-05-21 PROCEDURE — G0328 FECAL BLOOD SCRN IMMUNOASSAY: CPT | Mod: QW

## 2025-05-21 PROCEDURE — 72141 MRI NECK SPINE W/O DYE: CPT | Mod: 26

## 2025-05-21 PROCEDURE — G0444 DEPRESSION SCREEN ANNUAL: CPT | Mod: 59

## 2025-05-21 PROCEDURE — 72141 MRI NECK SPINE W/O DYE: CPT

## 2025-05-21 PROCEDURE — 99203 OFFICE O/P NEW LOW 30 MIN: CPT | Mod: 25

## 2025-05-21 RX ORDER — ZOLEDRONIC ACID 5 MG/100ML
5 INJECTION, SOLUTION INTRAVENOUS
Refills: 0 | Status: ACTIVE | COMMUNITY
Start: 2025-05-21

## 2025-05-22 LAB — HPV HIGH+LOW RISK DNA PNL CVX: NOT DETECTED

## 2025-05-27 LAB — CYTOLOGY CVX/VAG DOC THIN PREP: ABNORMAL

## 2025-06-04 ENCOUNTER — ASOB RESULT (OUTPATIENT)
Age: 75
End: 2025-06-04

## 2025-06-04 ENCOUNTER — APPOINTMENT (OUTPATIENT)
Dept: OBGYN | Facility: CLINIC | Age: 75
End: 2025-06-04
Payer: MEDICARE

## 2025-06-04 PROCEDURE — 76830 TRANSVAGINAL US NON-OB: CPT

## 2025-06-06 ENCOUNTER — APPOINTMENT (OUTPATIENT)
Dept: INTERNAL MEDICINE | Facility: CLINIC | Age: 75
End: 2025-06-06

## 2025-06-06 VITALS
WEIGHT: 92 LBS | OXYGEN SATURATION: 99 % | HEART RATE: 97 BPM | SYSTOLIC BLOOD PRESSURE: 127 MMHG | BODY MASS INDEX: 18.8 KG/M2 | DIASTOLIC BLOOD PRESSURE: 75 MMHG | HEIGHT: 58.5 IN

## 2025-06-06 PROCEDURE — 99214 OFFICE O/P EST MOD 30 MIN: CPT

## 2025-06-06 PROCEDURE — G2211 COMPLEX E/M VISIT ADD ON: CPT

## 2025-06-06 RX ORDER — METHYLPREDNISOLONE 4 MG/1
4 TABLET ORAL
Qty: 1 | Refills: 0 | Status: ACTIVE | COMMUNITY
Start: 2025-06-06 | End: 1900-01-01

## 2025-06-06 RX ORDER — AZITHROMYCIN 250 MG/1
250 TABLET, FILM COATED ORAL
Qty: 1 | Refills: 0 | Status: ACTIVE | COMMUNITY
Start: 2025-06-06 | End: 1900-01-01

## 2025-06-10 ENCOUNTER — RESULT REVIEW (OUTPATIENT)
Age: 75
End: 2025-06-10

## 2025-06-10 ENCOUNTER — APPOINTMENT (OUTPATIENT)
Dept: MAMMOGRAPHY | Facility: CLINIC | Age: 75
End: 2025-06-10
Payer: MEDICARE

## 2025-06-10 ENCOUNTER — NON-APPOINTMENT (OUTPATIENT)
Age: 75
End: 2025-06-10

## 2025-06-10 ENCOUNTER — OUTPATIENT (OUTPATIENT)
Dept: OUTPATIENT SERVICES | Facility: HOSPITAL | Age: 75
LOS: 1 days | End: 2025-06-10
Payer: MEDICARE

## 2025-06-10 DIAGNOSIS — Z98.891 HISTORY OF UTERINE SCAR FROM PREVIOUS SURGERY: Chronic | ICD-10-CM

## 2025-06-10 DIAGNOSIS — Z01.419 ENCOUNTER FOR GYNECOLOGICAL EXAMINATION (GENERAL) (ROUTINE) WITHOUT ABNORMAL FINDINGS: ICD-10-CM

## 2025-06-10 PROCEDURE — 77067 SCR MAMMO BI INCL CAD: CPT | Mod: 26

## 2025-06-10 PROCEDURE — 77067 SCR MAMMO BI INCL CAD: CPT

## 2025-06-10 PROCEDURE — 77063 BREAST TOMOSYNTHESIS BI: CPT

## 2025-06-10 PROCEDURE — 77063 BREAST TOMOSYNTHESIS BI: CPT | Mod: 26
